# Patient Record
Sex: MALE | Race: WHITE | NOT HISPANIC OR LATINO | Employment: OTHER | URBAN - NONMETROPOLITAN AREA
[De-identification: names, ages, dates, MRNs, and addresses within clinical notes are randomized per-mention and may not be internally consistent; named-entity substitution may affect disease eponyms.]

---

## 2017-12-12 ENCOUNTER — OFFICE VISIT (OUTPATIENT)
Dept: CARDIOLOGY | Facility: CLINIC | Age: 67
End: 2017-12-12

## 2017-12-12 VITALS
HEIGHT: 70 IN | DIASTOLIC BLOOD PRESSURE: 69 MMHG | WEIGHT: 186.6 LBS | HEART RATE: 67 BPM | BODY MASS INDEX: 26.71 KG/M2 | SYSTOLIC BLOOD PRESSURE: 131 MMHG | OXYGEN SATURATION: 96 %

## 2017-12-12 DIAGNOSIS — R00.2 PALPITATIONS: ICD-10-CM

## 2017-12-12 DIAGNOSIS — I25.10 CORONARY ARTERY DISEASE INVOLVING NATIVE CORONARY ARTERY OF NATIVE HEART WITHOUT ANGINA PECTORIS: Primary | ICD-10-CM

## 2017-12-12 DIAGNOSIS — I10 ESSENTIAL HYPERTENSION: ICD-10-CM

## 2017-12-12 DIAGNOSIS — E78.5 DYSLIPIDEMIA: ICD-10-CM

## 2017-12-12 PROCEDURE — 93000 ELECTROCARDIOGRAM COMPLETE: CPT | Performed by: NURSE PRACTITIONER

## 2017-12-12 PROCEDURE — 99214 OFFICE O/P EST MOD 30 MIN: CPT | Performed by: NURSE PRACTITIONER

## 2017-12-12 RX ORDER — LANOLIN ALCOHOL/MO/W.PET/CERES
400 CREAM (GRAM) TOPICAL DAILY
COMMUNITY

## 2017-12-12 NOTE — PATIENT INSTRUCTIONS
Coronary Artery Disease, Male  Coronary artery disease (CAD) is a process in which the blood vessels of the heart (coronary arteries) become narrow or blocked. The narrowing or blockage can lead to decreased blood flow to the heart muscle (angina). Prolonged reduced blood flow can cause a heart attack (myocardial infarction, MI). Because CAD is the leading cause of death in men, it is important to understand what causes this condition and how it is treated.  CAUSES  Atherosclerosis is the cause of CAD. This is the buildup of fat and cholesterol (plaque) on the inside of the arteries. Over time, the plaque may narrow or block the artery, and this will lessen blood flow to the heart. Plaque can also become weak and break off within a coronary artery to form a clot and cause a sudden blockage.  RISK FACTORS  Many risk factors increase your chances of getting CAD, including:  · High cholesterol levels.  · High blood pressure (hypertension).  · Tobacco use.  · Diabetes.  · Age. Men over age 45 are at a greater risk of CAD.  · Gender. Men often develop CAD earlier in life than women.  · Family history of CAD.  · Obesity.  · Lack of exercise.  · A diet high in saturated fats.  SYMPTOMS   Many people do not experience any symptoms during the early stages of CAD. As the condition progresses, symptoms may include:   · Chest pain.  ¨ The pain can be described as a crushing or squeezing in the chest, or a tightness, pressure, fullness, or heaviness in the chest.  ¨ The pain can last more than a few minutes or can stop and recur.   · Pain in the arms, neck, jaw, or back.  · Unexplained heartburn or indigestion.  · Shortness of breath.  · Nausea.  · Sudden cold sweats.   Less common symptoms of CAD in men can include:  · Fatigue.  · Unexplained feelings of nervousness or anxiety.  · Weakness.  · Diarrhea.  · Sudden light-headedness.  DIAGNOSIS   Tests to diagnose CAD may include:  · ECG (electrocardiogram).  · Exercise stress  test. This looks for signs of blockage when the heart is being exercised.  · Pharmacologic stress test. This test looks for signs of blockage when the heart is being stressed with a medicine.  · Blood tests.  · Coronary angiogram. This is a procedure to look at the coronary arteries to see if there is any blockage.  TREATMENT  The treatment of CAD may include the following:  · Healthy behavioral changes to reduce or control risk factors.  · Medicine.  · Coronary stenting. A stent helps to keep an artery open.  · Coronary angioplasty. This procedure widens a narrowed or blocked artery.  · Coronary artery bypass surgery. This will allow your blood to pass the blockage (bypass) to reach your heart.  HOME CARE INSTRUCTIONS  · Take medicines only as directed by your health care provider.  · Do not take the following medicines unless your health care provider approves:    Nonsteroidal anti-inflammatory drugs (NSAIDs), such as ibuprofen, naproxen, or celecoxib.    Vitamin supplements that contain vitamin A, vitamin E, or both.  · Manage other health conditions such as hypertension and diabetes as directed by your health care provider.  · Follow a heart-healthy diet. A dietitian can help to educate you about healthy food options and changes.  · Use healthy cooking methods such as roasting, grilling, broiling, baking, poaching, steaming, or stir-frying. Talk to a dietitian to learn more about healthy cooking methods.  · Follow an exercise program approved by your health care provider.  · Maintain a healthy weight. Lose weight as approved by your health care provider.  · Plan rest periods when fatigued.  · Learn to manage stress.  · Do not use any tobacco products, including cigarettes, chewing tobacco, or electronic cigarettes. If you need help quitting, ask your health care provider.  · If you drink alcohol, and your health care provider approves, limit your alcohol intake to no more than 1 drink per day. One drink equals  12 ounces of beer, 5 ounces of wine, or 1½ ounces of hard liquor.  · Stop illegal drug use.  · Your health care provider may ask you to monitor your blood pressure. A blood pressure reading consists of a higher number over a lower number, such as 110 over 72, which is written as 110/72. Ideally, your blood pressure should be:    Below 140/90 if you have no other medical conditions.    Below 130/80 if you have diabetes or kidney disease.  · Keep all follow-up visits as directed by your health care provider. This is important.  SEEK IMMEDIATE MEDICAL CARE IF:  · You have pain in your chest, neck, arm, jaw, stomach, or back that lasts more than a few minutes, is recurring, or is unrelieved by taking medicine under your tongue (sublingual nitroglycerin).  · You have profuse sweating without cause.  · You have unexplained:    Heartburn or indigestion.    Shortness of breath or difficulty breathing.    Nausea or vomiting.    Fatigue.    Feelings of nervousness or anxiety.    Weakness.    Diarrhea.  · You have sudden light-headedness or dizziness.  · You faint.  These symptoms may represent a serious problem that is an emergency. Do not wait to see if the symptoms will go away. Get medical help right away. Call your local emergency services (911 in the [place] Do not drive yourself to the hospital.     This information is not intended to replace advice given to you by your health care provider. Make sure you discuss any questions you have with your health care provider.     Document Released: 07/15/2015 Document Reviewed: 07/15/2015  [employer] Interactive Patient Education ©2017 [employer]

## 2017-12-12 NOTE — PROGRESS NOTES
Subjective   Raghav Warner is a 67 y.o. male     Chief Complaint   Patient presents with   • Hypertension     presents as a 1 year follow up       HPI    Problem list:    1. CAD  1.1 [facility] 11/15/2000 - Stent x 2 LAD  2. Hypertension  2.1 Echo 11/3/15 - EF > 65%; DD I; trace MR and TR; mild KS; PA 40-45  3. Dyslipidemia     Patient is a 67-year-old male who presents today for a follow-up.  He denies any chest pain or pressure.  He says he has very minimal palpitations at rest any barely even feels a little moving sensation.  He denies any dizziness, presyncope, syncope, orthopnea, PND or edema.  He denies any shortness of breath with activity.  He says he has had a lot of stress this year as he lost his mother-in-law, father and multiple friends.  Also just over a week ago his wife has surgery on her femur from a fall where someone fell on her and fractured it.  Says she is still currently in the hospital.  PCP monitors hia cholesterol.  He walks 4 1/2-6 miles/day.    Current Outpatient Prescriptions   Medication Sig Dispense Refill   • aspirin 325 MG tablet Take 325 mg by mouth Daily.     • folic acid (FOLVITE) 400 MCG tablet Take 400 mcg by mouth Daily.     • metoprolol tartrate (LOPRESSOR) 50 MG tablet Take 50 mg by mouth. .5 tablet bid  3   • nitroglycerin (NITROSTAT) 0.4 MG SL tablet Place 0.4 mg under the tongue Every 5 (Five) Minutes As Needed.     • ramipril (ALTACE) 10 MG capsule Take 10 mg by mouth Daily.  5   • simvastatin (ZOCOR) 20 MG tablet Take 20 mg by mouth Every Night.  5     No current facility-administered medications for this visit.        ALLERGIES    Plavix [clopidogrel bisulfate]    Past Medical History:   Diagnosis Date   • CAD (coronary artery disease)    • Dyslipidemia    • Hypertension    • Palpitations        Social History     Social History   • Marital status:      Spouse name: N/A   • Number of children: N/A   • Years of education: N/A     Occupational History   • Not on file.  "    Social History Main Topics   • Smoking status: Former Smoker   • Smokeless tobacco: Never Used   • Alcohol use No   • Drug use: No   • Sexual activity: Not on file     Other Topics Concern   • Not on file     Social History Narrative       Family History   Problem Relation Age of Onset   • Heart attack Other    • Diabetes Other    • Heart disease Other      CABG   • Hypertension Other        Review of Systems   Constitutional: Negative for diaphoresis and fatigue.   HENT: Positive for rhinorrhea and sneezing. Negative for sinus pressure.    Eyes: Positive for visual disturbance.   Respiratory: Negative for chest tightness and shortness of breath.    Cardiovascular: Positive for palpitations (minimal at rest; barely feel a little sensation ). Negative for chest pain and leg swelling.   Gastrointestinal: Negative for constipation, diarrhea, nausea and vomiting.   Endocrine: Negative.    Genitourinary: Negative for difficulty urinating.   Musculoskeletal: Positive for arthralgias, back pain and neck pain. Negative for myalgias.   Skin: Negative.    Allergic/Immunologic: Positive for environmental allergies.   Neurological: Negative for dizziness, syncope, light-headedness and headaches.   Hematological: Does not bruise/bleed easily.   Psychiatric/Behavioral: The patient is nervous/anxious ( patient has been under some stress lately with wife).        Objective   /69 (BP Location: Left arm, Patient Position: Sitting)  Pulse 67  Ht 177.8 cm (70\")  Wt 84.6 kg (186 lb 9.6 oz)  SpO2 96%  BMI 26.77 kg/m2  Vitals:    12/12/17 0816   BP: 131/69   BP Location: Left arm   Patient Position: Sitting   Pulse: 67   SpO2: 96%   Weight: 84.6 kg (186 lb 9.6 oz)   Height: 177.8 cm (70\")      Lab Results (most recent)     None        Physical Exam   Constitutional: He is oriented to person, place, and time. Vital signs are normal. He appears well-developed and well-nourished. He is active and cooperative.   HENT:   Head: " Normocephalic.   Eyes: Lids are normal.   Neck: Normal carotid pulses, no hepatojugular reflux and no JVD present. Carotid bruit is not present.   Cardiovascular: Normal rate, regular rhythm and normal heart sounds.    Pulses:       Radial pulses are 2+ on the right side, and 2+ on the left side.        Dorsalis pedis pulses are 2+ on the right side, and 2+ on the left side.        Posterior tibial pulses are 2+ on the right side, and 2+ on the left side.   No edema BLE.   Pulmonary/Chest: Effort normal and breath sounds normal.   Abdominal: Normal appearance and bowel sounds are normal.   Neurological: He is alert and oriented to person, place, and time.   Skin: Skin is warm, dry and intact.   Psychiatric: He has a normal mood and affect. His speech is normal and behavior is normal. Judgment and thought content normal. Cognition and memory are normal.       Procedure     ECG 12 Lead  Date/Time: 12/12/2017 8:22 AM  Performed by: ANDERSON CALERO  Authorized by: ANDERSON CALERO   Comparison: compared with previous ECG from 12/23/2016  Similar to previous ECG  Rhythm: sinus rhythm  Rate: normal  BPM: 62  QRS axis: normal  Clinical impression: normal ECG  Comments: Palpitations   Hypertension                    Assessment/Plan      Diagnosis Plan   1. Coronary artery disease involving native coronary artery of native heart without angina pectoris     2. Palpitations  ECG 12 Lead   3. Essential hypertension  ECG 12 Lead   4. Dyslipidemia         Return in about 1 year (around 12/12/2018).  CAD-patient is on aspirin, beta and statin.  Palpitations-patient filled disease are very minimal and nonproblematic.  Hypertension-patient doing well.  Dyslipidemia-patient is on Zocor monitor by PCP.  He will continue his medication regimen.  He'll follow-up in one year or sooner if any changes.    Called PCP to [facility]. [phone].         Electronically signed by:

## 2018-12-12 ENCOUNTER — OFFICE VISIT (OUTPATIENT)
Dept: CARDIOLOGY | Facility: CLINIC | Age: 68
End: 2018-12-12

## 2018-12-12 VITALS
DIASTOLIC BLOOD PRESSURE: 73 MMHG | OXYGEN SATURATION: 99 % | SYSTOLIC BLOOD PRESSURE: 130 MMHG | WEIGHT: 188.6 LBS | BODY MASS INDEX: 27 KG/M2 | HEART RATE: 76 BPM | HEIGHT: 70 IN

## 2018-12-12 DIAGNOSIS — E78.5 DYSLIPIDEMIA: ICD-10-CM

## 2018-12-12 DIAGNOSIS — I10 ESSENTIAL HYPERTENSION: ICD-10-CM

## 2018-12-12 DIAGNOSIS — R00.2 PALPITATIONS: ICD-10-CM

## 2018-12-12 DIAGNOSIS — I25.10 CORONARY ARTERY DISEASE INVOLVING NATIVE CORONARY ARTERY OF NATIVE HEART WITHOUT ANGINA PECTORIS: Primary | ICD-10-CM

## 2018-12-12 PROCEDURE — 93000 ELECTROCARDIOGRAM COMPLETE: CPT | Performed by: NURSE PRACTITIONER

## 2018-12-12 PROCEDURE — 99213 OFFICE O/P EST LOW 20 MIN: CPT | Performed by: NURSE PRACTITIONER

## 2018-12-12 NOTE — PROGRESS NOTES
Subjective   Raghav Warner is a 68 y.o. male     Chief Complaint   Patient presents with   • Follow-up     Pt in office for annual follow up    • Chest Pain   • Coronary Artery Disease       HPI    Problem list:    1. CAD  1.1 [facility] 11/15/2000 - Stent x 2 LAD  2. Hypertension  2.1 Echo 11/3/15 - EF > 65%; DD I; trace MR and TR; mild CT; PA 40-45  3. Dyslipidemia     Patient is a 68-year-old male who presents today for follow-up.  He denies any chest pain, pressure, dizziness, presyncope, syncope, orthopnea, PND or edema.  He says that he does have palpitations at times but they're very rare.  He states that he does not have any shortness of breath with activity.  He walks anywhere from 4-6 miles a day.  He's going fishing tonight.  PCP monitors his cholesterol.  He says that he had epigastric pain and real bad weakness when he had his previous stents placed.  He says he's had none of this.    Current Outpatient Medications   Medication Sig Dispense Refill   • aspirin 325 MG tablet Take 325 mg by mouth Daily.     • folic acid (FOLVITE) 400 MCG tablet Take 400 mcg by mouth Daily.     • metoprolol tartrate (LOPRESSOR) 50 MG tablet Take 50 mg by mouth. .5 tablet bid  3   • nitroglycerin (NITROSTAT) 0.4 MG SL tablet Place 0.4 mg under the tongue Every 5 (Five) Minutes As Needed.     • ramipril (ALTACE) 10 MG capsule Take 10 mg by mouth Daily.  5   • simvastatin (ZOCOR) 20 MG tablet Take 20 mg by mouth Every Night.  5     No current facility-administered medications for this visit.        ALLERGIES    Plavix [clopidogrel bisulfate]    Past Medical History:   Diagnosis Date   • Bone spur     neck    • Bulging lumbar disc    • CAD (coronary artery disease)    • Dyslipidemia    • Hypertension    • Palpitations        Social History     Socioeconomic History   • Marital status:      Spouse name: Not on file   • Number of children: Not on file   • Years of education: Not on file   • Highest education level: Not on file  "  Social Needs   • Financial resource strain: Not on file   • Food insecurity - worry: Not on file   • Food insecurity - inability: Not on file   • Transportation needs - medical: Not on file   • Transportation needs - non-medical: Not on file   Occupational History   • Not on file   Tobacco Use   • Smoking status: Former Smoker   • Smokeless tobacco: Never Used   Substance and Sexual Activity   • Alcohol use: No   • Drug use: No   • Sexual activity: Not on file   Other Topics Concern   • Not on file   Social History Narrative   • Not on file       Family History   Problem Relation Age of Onset   • Heart attack Other    • Diabetes Other    • Heart disease Other         CABG   • Hypertension Other        Review of Systems   Constitutional: Negative for diaphoresis and fever.   HENT: Positive for postnasal drip and rhinorrhea (\"sinus problems\" ). Negative for congestion and sore throat.    Eyes: Positive for visual disturbance (reading glasses).   Respiratory: Positive for cough (dry cough, but patient says it is not bothersome and doesn't want to change anything ). Negative for chest tightness and shortness of breath.    Cardiovascular: Positive for palpitations (occasionally). Negative for chest pain and leg swelling.   Gastrointestinal: Negative for abdominal pain, blood in stool, constipation, diarrhea, nausea and vomiting.   Endocrine: Negative for cold intolerance and heat intolerance.        Pt states that he sweats really easily. States in the summer he will walk around and the sweat drenches his shirt   Genitourinary: Positive for frequency (enlarged prostate) and urgency (enlarged prostate). Negative for difficulty urinating, dysuria and hematuria.   Musculoskeletal: Positive for arthralgias, back pain (disc) and neck pain (bone sppur).   Skin: Negative for rash and wound.   Allergic/Immunologic: Negative for environmental allergies and food allergies.   Neurological: Positive for numbness (some in right " "arm- states he was told it's due to his neck ). Negative for dizziness, syncope, weakness, light-headedness and headaches.   Hematological: Does not bruise/bleed easily.   Psychiatric/Behavioral: Positive for sleep disturbance (States that he has some pain in his back and neck that keeps him awake ).       Objective   /73   Pulse 76   Ht 177.8 cm (70\")   Wt 85.5 kg (188 lb 9.6 oz)   SpO2 99%   BMI 27.06 kg/m²   Vitals:    12/12/18 0811   BP: 130/73   Pulse: 76   SpO2: 99%   Weight: 85.5 kg (188 lb 9.6 oz)   Height: 177.8 cm (70\")      Lab Results (most recent)     None        Physical Exam   Constitutional: He is oriented to person, place, and time. Vital signs are normal. He appears well-developed and well-nourished. He is active and cooperative.   HENT:   Head: Normocephalic.   Eyes: Lids are normal.   Neck: Normal carotid pulses, no hepatojugular reflux and no JVD present. Carotid bruit is not present.   Cardiovascular: Normal rate, regular rhythm and normal heart sounds.  Occasional extrasystoles are present.   Pulses:       Radial pulses are 2+ on the right side, and 2+ on the left side.        Dorsalis pedis pulses are 2+ on the right side, and 2+ on the left side.        Posterior tibial pulses are 2+ on the right side, and 2+ on the left side.   No edema BLE.    Pulmonary/Chest: Effort normal and breath sounds normal.   Abdominal: Normal appearance and bowel sounds are normal.   Neurological: He is alert and oriented to person, place, and time.   Skin: Skin is warm, dry and intact.   Psychiatric: He has a normal mood and affect. His speech is normal and behavior is normal. Judgment and thought content normal. Cognition and memory are normal.       Procedure     ECG 12 Lead  Date/Time: 12/12/2018 8:37 AM  Performed by: iD Paz APRN  Authorized by: Di Paz APRN   Comparison: compared with previous ECG from 12/12/2017  Rhythm: sinus rhythm  Ectopy: atrial premature " contractions  Rate: normal  BPM: 80  QRS axis: normal  Q waves: II and aVF  Clinical impression: non-specific ECG                 Assessment/Plan      Diagnosis Plan   1. Coronary artery disease involving native coronary artery of native heart without angina pectoris     2. Essential hypertension     3. Dyslipidemia     4. Palpitations         Return in about 1 year (around 12/12/2019).       CAD-patient is on aspirin, statin and beta.  Hypertension-patient doing well.  Hyperlipidemia-patient is on simvastatin by PCP.  Palpitations-stable.  Patient continues medication regimen.  He'll follow-up in one year or sooner if any changes.  I did talk to the patient is a dry cough and advised it could be from his Altase but he does not want to make any changes at this time.      Patient's Body mass index is 27.06 kg/m². BMI is above normal parameters. Recommendations include: educational material.      Electronically signed by:

## 2018-12-12 NOTE — PATIENT INSTRUCTIONS
"Fat and Cholesterol Restricted Diet  Getting too much fat and cholesterol in your diet may cause health problems. Following this diet helps keep your fat and cholesterol at normal levels. This can keep you from getting sick.  What types of fat should I choose?  · Choose monosaturated and polyunsaturated fats. These are found in foods such as olive oil, canola oil, flaxseeds, walnuts, almonds, and seeds.  · Eat more omega-3 fats. Good choices include salmon, mackerel, sardines, tuna, flaxseed oil, and ground flaxseeds.  · Limit saturated fats. These are in animal products such as meats, butter, and cream. They can also be in plant products such as palm oil, palm kernel oil, and coconut oil.  · Avoid foods with partially hydrogenated oils in them. These contain trans fats. Examples of foods that have trans fats are stick margarine, some tub margarines, cookies, crackers, and other baked goods.  What general guidelines do I need to follow?  · Check food labels. Look for the words \"trans fat\" and \"saturated fat.\"  · When preparing a meal:  ? Fill half of your plate with vegetables and green salads.  ? Fill one fourth of your plate with whole grains. Look for the word \"whole\" as the first word in the ingredient list.  ? Fill one fourth of your plate with lean protein foods.  · Eat more foods that have fiber, like apples, carrots, beans, peas, and barley.  · Eat more home-cooked foods. Eat less at restaurants and buffets.  · Limit or avoid alcohol.  · Limit foods high in starch and sugar.  · Limit fried foods.  · Cook foods without frying them. Baking, boiling, grilling, and broiling are all great options.  · Lose weight if you are overweight. Losing even a small amount of weight can help your overall health. It can also help prevent diseases such as diabetes and heart disease.  What foods can I eat?  Grains  Whole grains, such as whole wheat or whole grain breads, crackers, cereals, and pasta. Unsweetened oatmeal, " bulgur, barley, quinoa, or brown rice. Corn or whole wheat flour tortillas.  Vegetables  Fresh or frozen vegetables (raw, steamed, roasted, or grilled). Green salads.  Fruits  All fresh, canned (in natural juice), or frozen fruits.  Meat and Other Protein Products  Ground beef (85% or leaner), grass-fed beef, or beef trimmed of fat. Skinless chicken or turkey. Ground chicken or turkey. Pork trimmed of fat. All fish and seafood. Eggs. Dried beans, peas, or lentils. Unsalted nuts or seeds. Unsalted canned or dry beans.  Dairy  Low-fat dairy products, such as skim or 1% milk, 2% or reduced-fat cheeses, low-fat ricotta or cottage cheese, or plain low-fat yogurt.  Fats and Oils  Tub margarines without trans fats. Light or reduced-fat mayonnaise and salad dressings. Avocado. Olive, canola, sesame, or safflower oils. Natural peanut or almond butter (choose ones without added sugar and oil).  The items listed above may not be a complete list of recommended foods or beverages. Contact your dietitian for more options.  What foods are not recommended?  Grains  White bread. White pasta. White rice. Cornbread. Bagels, pastries, and croissants. Crackers that contain trans fat.  Vegetables  White potatoes. Corn. Creamed or fried vegetables. Vegetables in a cheese sauce.  Fruits  Dried fruits. Canned fruit in light or heavy syrup. Fruit juice.  Meat and Other Protein Products  Fatty cuts of meat. Ribs, chicken wings, jessica, sausage, bologna, salami, chitterlings, fatback, hot dogs, bratwurst, and packaged luncheon meats. Liver and organ meats.  Dairy  Whole or 2% milk, cream, half-and-half, and cream cheese. Whole milk cheeses. Whole-fat or sweetened yogurt. Full-fat cheeses. Nondairy creamers and whipped toppings. Processed cheese, cheese spreads, or cheese curds.  Sweets and Desserts  Corn syrup, sugars, honey, and molasses. Candy. Jam and jelly. Syrup. Sweetened cereals. Cookies, pies, cakes, donuts, muffins, and ice  cream.  Fats and Oils  Butter, stick margarine, lard, shortening, ghee, or jessica fat. Coconut, palm kernel, or palm oils.  Beverages  Alcohol. Sweetened drinks (such as sodas, lemonade, and fruit drinks or punches).  The items listed above may not be a complete list of foods and beverages to avoid. Contact your dietitian for more information.  This information is not intended to replace advice given to you by your health care provider. Make sure you discuss any questions you have with your health care provider.  Document Released: 06/18/2013 Document Revised: 08/24/2017 Document Reviewed: 03/19/2015  [employer] Interactive Patient Education © 2018 [employer]  BMI for Adults  Body mass index (BMI) is a number that is calculated from a person's weight and height. In most adults, the number is used to find how much of an adult's weight is made up of fat. BMI is not as accurate as a direct measure of body fat.  How is BMI calculated?  BMI is calculated by dividing weight in kilograms by height in meters squared. It can also be calculated by dividing weight in pounds by height in inches squared, then multiplying the resulting number by 703. Charts are available to help you find your BMI quickly and easily without doing this calculation.  How is BMI interpreted?  Health care professionals use BMI charts to identify whether an adult is underweight, at a normal weight, or overweight based on the following guidelines:  · Underweight: BMI less than 18.5.  · Normal weight: BMI between 18.5 and 24.9.  · Overweight: BMI between 25 and 29.9.  · Obese: BMI of 30 and above.    BMI is usually interpreted the same for males and females.  Weight includes both fat and muscle, so someone with a muscular build, such as an athlete, may have a BMI that is higher than 24.9. In cases like these, BMI may not accurately depict body fat. To determine if excess body fat is the cause of a BMI of 25 or higher, further assessments may need to be  done by a health care provider.  Why is BMI a useful tool?  BMI is used to identify a possible weight problem that may be related to a medical problem or may increase the risk for medical problems. BMI can also be used to promote changes to reach a healthy weight.  This information is not intended to replace advice given to you by your health care provider. Make sure you discuss any questions you have with your health care provider.  Document Released: 08/29/2005 Document Revised: 04/27/2017 Document Reviewed: 05/15/2015  [email or web address] Interactive Patient Education © 2018 [email or web address]    Coronary Artery Disease, Male  Coronary artery disease (CAD) is a condition in which the arteries that lead to the heart (coronary arteries) become narrow or blocked. The narrowing or blockage can lead to decreased blood flow to the heart. Prolonged reduced blood flow can cause a heart attack (myocardial infarction or MI). This condition may also be called coronary heart disease.  Because CAD is the leading cause of death in men, it is important to understand what causes this condition and how it is treated.  What are the causes?  CAD is most often caused by atherosclerosis. This is the buildup of fat and cholesterol (plaque) on the inside of the arteries. Over time, the plaque may narrow or block the artery, reducing blood flow to the heart. Plaque can also become weak and break off within a coronary artery and cause a sudden blockage. Other less common causes of CAD include:  · An embolism or blood clot in a coronary artery.  · A tearing of the artery (spontaneous coronary artery dissection).  · An aneurysm.  · Inflammation (vasculitis) in the artery wall.    What increases the risk?  The following factors may make you more likely to develop this condition:  · Age. Men over age 45 are at a greater risk of CAD.  · Family history of CAD.  · Gender. Men often develop CAD earlier in life than women.  · High blood pressure  (hypertension).  · Diabetes.  · High cholesterol levels.  · Tobacco use.  · Excessive alcohol use.  · Lack of exercise.  · A diet high in saturated and trans fats, such as fried food and processed meat.    Other possible risk factors include:  · High stress levels.  · Depression.  · Obesity.  · Sleep apnea.    What are the signs or symptoms?  Many people do not have any symptoms during the early stages of CAD. As the condition progresses, symptoms may include:  · Chest pain (angina). The pain can:  ? Feel like a crushing or squeezing, or a tightness, pressure, fullness, or heaviness in the chest.  ? Last more than a few minutes or can stop and recur. The pain tends to get worse with exercise or stress and to fade with rest.  · Pain in the arms, neck, jaw, or back.  · Unexplained heartburn or indigestion.  · Shortness of breath.  · Nausea or vomiting.  · Sudden light-headedness.  · Sudden cold sweats.  · Fluttering or fast heartbeat (palpitations).    How is this diagnosed?  This condition is diagnosed based on:  · Your family and medical history.  · A physical exam.  · Tests, including:  ? A test to check the electrical signals in your heart (electrocardiogram).  ? Exercise stress test. This looks for signs of blockage when the heart is stressed with exercise, such as running on a treadmill.  ? Pharmacologic stress test. This test looks for signs of blockage when the heart is being stressed with a medicine.  ? Blood tests.  ? Coronary angiogram. This is a procedure to look at the coronary arteries to see if there is any blockage. During this test, a dye is injected into your arteries so they appear on an X-ray.  ? A test that uses sound waves to take a picture of your heart (echocardiogram).  ? Chest X-ray.    How is this treated?  This condition may be treated by:  · Healthy lifestyle changes to reduce risk factors.  · Medicines such as:  ? Antiplatelet medicines and blood-thinning medicines, such as aspirin. These  help to prevent blood clots.  ? Nitroglycerin.  ? Blood pressure medicines.  ? Cholesterol-lowering medicine.  · Coronary angioplasty and stenting. During this procedure, a thin, flexible tube is inserted through a blood vessel and into a blocked artery. A balloon or similar device on the end of the tube is inflated to open up the artery. In some cases, a small, mesh tube (stent) is inserted into the artery to keep it open.  · Coronary artery bypass surgery. During this surgery, veins or arteries from other parts of the body are used to create a bypass around the blockage and allow blood to reach your heart.    Follow these instructions at home:  Medicines  · Take over-the-counter and prescription medicines only as told by your health care provider.  · Do not take the following medicines unless your health care provider approves:  ? NSAIDs, such as ibuprofen, naproxen, or celecoxib.  ? Vitamin supplements that contain vitamin A, vitamin E, or both.  Lifestyle  · Follow an exercise program approved by your health care provider. Aim for 150 minutes of moderate exercise or 75 minutes of vigorous exercise each week.  · Maintain a healthy weight or lose weight as approved by your health care provider.  · Rest when you are tired.  · Learn to manage stress or try to limit your stress. Ask your health care provider for suggestions if you need help.  · Get screened for depression and seek treatment, if needed.  · Do not use any products that contain nicotine or tobacco, such as cigarettes and e-cigarettes. If you need help quitting, ask your health care provider.  · Do not use illegal drugs.  Eating and drinking  · Follow a heart-healthy diet. A dietitian can help educate you about healthy food options and changes. In general, eat plenty of fruits and vegetables, lean meats, and whole grains.  · Avoid foods high in:  ? Sugar.  ? Salt (sodium).  ? Saturated fat, such as processed or fatty meat.  ? Trans fat, such as fried  "foods.  · Use healthy cooking methods such as roasting, grilling, broiling, baking, poaching, steaming, or stir-frying.  · If you drink alcohol, and your health care provider approves, limit your alcohol intake to no more than 2 drinks per day. One drink equals 12 ounces of beer, 5 ounces of wine, or 1½ ounces of hard liquor.  General instructions  · Manage any other health conditions, such as hypertension and diabetes. These conditions affect your heart.  · Your health care provider may ask you to monitor your blood pressure. Ideally, your blood pressure should be below 130/80.  · Keep all follow-up visits as told by your health care provider. This is important.  Get help right away if:  · You have pain in your chest, neck, arm, jaw, stomach, or back that:  ? Lasts more than a few minutes.  ? Is recurring.  ? Is not relieved by taking medicine under your tongue (sublingualnitroglycerin).  · You have too much (profuse) sweating without cause.  · You have unexplained:  ? Heartburn or indigestion.  ? Shortness of breath or difficulty breathing.  ? Fluttering or fast heartbeat (palpitations).  ? Nausea or vomiting.  ? Fatigue.  ? Feelings of nervousness or anxiety.  ? Weakness.  ? Diarrhea.  · You have sudden light-headedness or dizziness.  · You faint.  · You feel like hurting yourself or think about taking your own life.  These symptoms may represent a serious problem that is an emergency. Do not wait to see if the symptoms will go away. Get medical help right away. Call your local emergency services (911 in the [place] Do not drive yourself to the hospital.  Summary  · Coronary artery disease (CAD) is a process in which the arteries that lead to the heart (coronary arteries) become narrow or blocked. The narrowing or blockage can lead to a heart attack.  · Many people do not have any symptoms during the early stages of CAD. This is called \"silent CAD.\"  · CAD can be treated with lifestyle changes, medicines, surgery, " or a combination of these treatments.  This information is not intended to replace advice given to you by your health care provider. Make sure you discuss any questions you have with your health care provider.  Document Released: 07/15/2015 Document Revised: 12/08/2017 Document Reviewed: 12/08/2017  [employer] Interactive Patient Education © 2018 [employer]

## 2019-12-12 ENCOUNTER — OFFICE VISIT (OUTPATIENT)
Dept: CARDIOLOGY | Facility: CLINIC | Age: 69
End: 2019-12-12

## 2019-12-12 VITALS
OXYGEN SATURATION: 97 % | SYSTOLIC BLOOD PRESSURE: 147 MMHG | DIASTOLIC BLOOD PRESSURE: 79 MMHG | HEIGHT: 70 IN | HEART RATE: 92 BPM | WEIGHT: 189.8 LBS | BODY MASS INDEX: 27.17 KG/M2

## 2019-12-12 DIAGNOSIS — R00.2 PALPITATIONS: ICD-10-CM

## 2019-12-12 DIAGNOSIS — E78.5 DYSLIPIDEMIA: ICD-10-CM

## 2019-12-12 DIAGNOSIS — I10 ESSENTIAL HYPERTENSION: ICD-10-CM

## 2019-12-12 DIAGNOSIS — I25.10 CORONARY ARTERY DISEASE INVOLVING NATIVE CORONARY ARTERY OF NATIVE HEART WITHOUT ANGINA PECTORIS: Primary | ICD-10-CM

## 2019-12-12 PROCEDURE — 99214 OFFICE O/P EST MOD 30 MIN: CPT | Performed by: NURSE PRACTITIONER

## 2019-12-12 PROCEDURE — 93000 ELECTROCARDIOGRAM COMPLETE: CPT | Performed by: NURSE PRACTITIONER

## 2019-12-12 NOTE — PATIENT INSTRUCTIONS
"Fat and Cholesterol Restricted Eating Plan  Getting too much fat and cholesterol in your diet may cause health problems. Choosing the right foods helps keep your fat and cholesterol at normal levels. This can keep you from getting certain diseases.  Your doctor may recommend an eating plan that includes:  · Total fat: ______% or less of total calories a day.  · Saturated fat: ______% or less of total calories a day.  · Cholesterol: less than _________mg a day.  · Fiber: ______g a day.  What are tips for following this plan?  Meal planning  · At meals, divide your plate into four equal parts:  ? Fill one-half of your plate with vegetables and green salads.  ? Fill one-fourth of your plate with whole grains.  ? Fill one-fourth of your plate with low-fat (lean) protein foods.  · Eat fish that is high in omega-3 fats at least two times a week. This includes mackerel, tuna, sardines, and salmon.  · Eat foods that are high in fiber, such as whole grains, beans, apples, broccoli, carrots, peas, and barley.  General tips    · Work with your doctor to lose weight if you need to.  · Avoid:  ? Foods with added sugar.  ? Fried foods.  ? Foods with partially hydrogenated oils.  · Limit alcohol intake to no more than 1 drink a day for nonpregnant women and 2 drinks a day for men. One drink equals 12 oz of beer, 5 oz of wine, or 1½ oz of hard liquor.  Reading food labels  · Check food labels for:  ? Trans fats.  ? Partially hydrogenated oils.  ? Saturated fat (g) in each serving.  ? Cholesterol (mg) in each serving.  ? Fiber (g) in each serving.  · Choose foods with healthy fats, such as:  ? Monounsaturated fats.  ? Polyunsaturated fats.  ? Omega-3 fats.  · Choose grain products that have whole grains. Look for the word \"whole\" as the first word in the ingredient list.  Cooking  · Cook foods using low-fat methods. These include baking, boiling, grilling, and broiling.  · Eat more home-cooked foods. Eat at restaurants and buffets " less often.  · Avoid cooking using saturated fats, such as butter, cream, palm oil, palm kernel oil, and coconut oil.  Recommended foods    Fruits  · All fresh, canned (in natural juice), or frozen fruits.  Vegetables  · Fresh or frozen vegetables (raw, steamed, roasted, or grilled). Green salads.  Grains  · Whole grains, such as whole wheat or whole grain breads, crackers, cereals, and pasta. Unsweetened oatmeal, bulgur, barley, quinoa, or brown rice. Corn or whole wheat flour tortillas.  Meats and other protein foods  · Ground beef (85% or leaner), grass-fed beef, or beef trimmed of fat. Skinless chicken or turkey. Ground chicken or turkey. Pork trimmed of fat. All fish and seafood. Egg whites. Dried beans, peas, or lentils. Unsalted nuts or seeds. Unsalted canned beans. Nut butters without added sugar or oil.  Dairy  · Low-fat or nonfat dairy products, such as skim or 1% milk, 2% or reduced-fat cheeses, low-fat and fat-free ricotta or cottage cheese, or plain low-fat and nonfat yogurt.  Fats and oils  · Tub margarine without trans fats. Light or reduced-fat mayonnaise and salad dressings. Avocado. Olive, canola, sesame, or safflower oils.  The items listed above may not be a complete list of foods and beverages you can eat. Contact a dietitian for more information.  Foods to avoid  Fruits  · Canned fruit in heavy syrup. Fruit in cream or butter sauce. Fried fruit.  Vegetables  · Vegetables cooked in cheese, cream, or butter sauce. Fried vegetables.  Grains  · White bread. White pasta. White rice. Cornbread. Bagels, pastries, and croissants. Crackers and snack foods that contain trans fat and hydrogenated oils.  Meats and other protein foods  · Fatty cuts of meat. Ribs, chicken wings, jessica, sausage, bologna, salami, chitterlings, fatback, hot dogs, bratwurst, and packaged lunch meats. Liver and organ meats. Whole eggs and egg yolks. Chicken and turkey with skin. Fried meat.  Dairy  · Whole or 2% milk, cream,  half-and-half, and cream cheese. Whole milk cheeses. Whole-fat or sweetened yogurt. Full-fat cheeses. Nondairy creamers and whipped toppings. Processed cheese, cheese spreads, and cheese curds.  Beverages  · Alcohol. Sugar-sweetened drinks such as sodas, lemonade, and fruit drinks.  Fats and oils  · Butter, stick margarine, lard, shortening, ghee, or jessica fat. Coconut, palm kernel, and palm oils.  Sweets and desserts  · Corn syrup, sugars, honey, and molasses. Candy. Jam and jelly. Syrup. Sweetened cereals. Cookies, pies, cakes, donuts, muffins, and ice cream.  The items listed above may not be a complete list of foods and beverages you should avoid. Contact a dietitian for more information.  Summary  · Choosing the right foods helps keep your fat and cholesterol at normal levels. This can keep you from getting certain diseases.  · At meals, fill one-half of your plate with vegetables and green salads.  · Eat high-fiber foods, like whole grains, beans, apples, carrots, peas, and barley.  · Limit added sugar, saturated fats, alcohol, and fried foods.  This information is not intended to replace advice given to you by your health care provider. Make sure you discuss any questions you have with your health care provider.  Document Released: 06/18/2013 Document Revised: 08/21/2019 Document Reviewed: 09/04/2018  [employer] Interactive Patient Education © 2019 [employer]  BMI for Adults    Body mass index (BMI) is a number that is calculated from a person's weight and height. BMI may help to estimate how much of a person's weight is composed of fat. BMI can help identify those who may be at higher risk for certain medical problems.  How is BMI used with adults?  BMI is used as a screening tool to identify possible weight problems. It is used to check whether a person is obese, overweight, healthy weight, or underweight.  How is BMI calculated?  BMI measures your weight and compares it to your height. This can be done  "either in English ([place] or metric measurements. Note that charts are available to help you find your BMI quickly and easily without having to do these calculations yourself.  To calculate your BMI in English ([place] measurements, your health care provider will:  1. Measure your weight in pounds (lb).  2. Multiply the number of pounds by 703.  ? For example, for a person who weighs 180 lb, multiply that number by 703, which equals 126,540.  3. Measure your height in inches (in). Then multiply that number by itself to get a measurement called \"inches squared.\"  ? For example, for a person who is 70 in tall, the \"inches squared\" measurement is 70 in x 70 in, which equals 4900 inches squared.  4. Divide the total from Step 2 (number of lb x 703) by the total from Step 3 (inches squared): 126,540 ÷ 4900 = 25.8. This is your BMI.  To calculate your BMI in metric measurements, your health care provider will:  1. Measure your weight in kilograms (kg).  2. Measure your height in meters (m). Then multiply that number by itself to get a measurement called \"meters squared.\"  ? For example, for a person who is 1.75 m tall, the \"meters squared\" measurement is 1.75 m x 1.75 m, which is equal to 3.1 meters squared.  3. Divide the number of kilograms (your weight) by the meters squared number. In this example: 70 ÷ 3.1 = 22.6. This is your BMI.  How is BMI interpreted?  To interpret your results, your health care provider will use BMI charts to identify whether you are underweight, normal weight, overweight, or obese. The following guidelines will be used:  · Underweight: BMI less than 18.5.  · Normal weight: BMI between 18.5 and 24.9.  · Overweight: BMI between 25 and 29.9.  · Obese: BMI of 30 and above.  Please note:  · Weight includes both fat and muscle, so someone with a muscular build, such as an athlete, may have a BMI that is higher than 24.9. In cases like these, BMI is not an accurate measure of body fat.  · To determine " if excess body fat is the cause of a BMI of 25 or higher, further assessments may need to be done by a health care provider.  · BMI is usually interpreted in the same way for men and women.  Why is BMI a useful tool?  BMI is useful in two ways:  · Identifying a weight problem that may be related to a medical condition, or that may increase the risk for medical problems.  · Promoting lifestyle and diet changes in order to reach a healthy weight.  Summary  · Body mass index (BMI) is a number that is calculated from a person's weight and height.  · BMI may help to estimate how much of a person's weight is composed of fat. BMI can help identify those who may be at higher risk for certain medical problems.  · BMI can be measured using English measurements or metric measurements.  · To interpret your results, your health care provider will use BMI charts to identify whether you are underweight, normal weight, overweight, or obese.  This information is not intended to replace advice given to you by your health care provider. Make sure you discuss any questions you have with your health care provider.  Document Released: 08/29/2005 Document Revised: 10/31/2018 Document Reviewed: 10/31/2018  [employer] Interactive Patient Education © 2019 [email or web address]

## 2019-12-12 NOTE — PROGRESS NOTES
Subjective   Raghva Warner is a 69 y.o. male     Chief Complaint   Patient presents with   • Follow-up     Here for a one year follow up        HPI    Problem list:    1. CAD  1.1 LHC 11/15/2000 - Stent x 2 LAD  2. Hypertension  2.1 Echo 11/3/15 - EF > 65%; DD I; trace MR and TR; mild NJ; PA 40-45  3. Dyslipidemia     Patient is a 69-year-old male who presents today for follow-up.  He denies any chest pain or pressure.  He says he has just an occasional palpitation.  He denies any dizziness, presyncope, syncope, orthopnea, PND or edema.  He says he gets lightheaded with very rare when he stands too fast.  He says typically this is when he is exercising taking his blood pressure medicine and when he comes back his blood pressure is sometimes 99 systolic or 101 systolic.  He says otherwise his blood pressure is usually in the 120s.  He says this is not very frequent at all that his blood pressure gets that low.  He denies any shortness of breath with activity.  PCP monitors his cholesterol which he brought in with for me today and his last LDL was 73.    Current Outpatient Medications on File Prior to Visit   Medication Sig Dispense Refill   • aspirin 325 MG tablet Take 325 mg by mouth Daily.     • folic acid (FOLVITE) 400 MCG tablet Take 400 mcg by mouth Daily.     • metoprolol tartrate (LOPRESSOR) 50 MG tablet Take 50 mg by mouth. .5 tablet bid  3   • nitroglycerin (NITROSTAT) 0.4 MG SL tablet Place 0.4 mg under the tongue Every 5 (Five) Minutes As Needed.     • ramipril (ALTACE) 10 MG capsule Take 10 mg by mouth Daily.  5   • simvastatin (ZOCOR) 20 MG tablet Take 20 mg by mouth Every Other Day.  5     No current facility-administered medications on file prior to visit.        ALLERGIES    Plavix [clopidogrel bisulfate] and Prednisone    Past Medical History:   Diagnosis Date   • Bone spur     neck    • Bulging lumbar disc    • CAD (coronary artery disease)    • Dyslipidemia    • Hypertension    • Palpitations         Social History     Socioeconomic History   • Marital status:      Spouse name: Not on file   • Number of children: Not on file   • Years of education: Not on file   • Highest education level: Not on file   Tobacco Use   • Smoking status: Former Smoker   • Smokeless tobacco: Never Used   Substance and Sexual Activity   • Alcohol use: No   • Drug use: No       Family History   Problem Relation Age of Onset   • Heart attack Other    • Diabetes Other    • Heart disease Other         CABG   • Hypertension Other    • Diabetes Mother    • Hypertension Mother    • Diabetes Father    • Hypertension Father    • Heart disease Father    • Hypertension Sister        Review of Systems   Constitutional: Positive for fatigue. Negative for chills and fever.   HENT: Positive for rhinorrhea. Negative for congestion and sore throat.    Eyes: Positive for visual disturbance (reading glasses ).   Respiratory: Negative.  Negative for chest tightness and shortness of breath.    Cardiovascular: Positive for palpitations (occasional palps ). Negative for chest pain and leg swelling.   Gastrointestinal: Negative.  Negative for abdominal pain, blood in stool, nausea and vomiting.   Endocrine: Negative.  Negative for cold intolerance and heat intolerance.   Genitourinary: Positive for difficulty urinating and frequency. Negative for dysuria, hematuria and urgency.   Musculoskeletal: Positive for arthralgias (joints ), back pain (pain in entire back ) and neck pain.   Skin: Negative.  Negative for rash and wound.   Allergic/Immunologic: Negative.  Negative for environmental allergies and food allergies.   Neurological: Positive for light-headedness (rarely when standing too fast ). Negative for dizziness and weakness.   Hematological: Negative.  Does not bruise/bleed easily.   Psychiatric/Behavioral: Negative.  Negative for sleep disturbance (denies waking with smothering ).       Objective   /79 (BP Location: Left arm,  "Patient Position: Sitting)   Pulse 92   Ht 177.8 cm (70\")   Wt 86.1 kg (189 lb 12.8 oz)   SpO2 97%   BMI 27.23 kg/m²   Vitals:    12/12/19 0808   BP: 147/79   BP Location: Left arm   Patient Position: Sitting   Pulse: 92   SpO2: 97%   Weight: 86.1 kg (189 lb 12.8 oz)   Height: 177.8 cm (70\")      Lab Results (most recent)     None        Physical Exam   Constitutional: He is oriented to person, place, and time. Vital signs are normal. He appears well-developed and well-nourished. He is active and cooperative.   HENT:   Head: Normocephalic.   Eyes: Lids are normal.   Neck: Normal carotid pulses, no hepatojugular reflux and no JVD present. Carotid bruit is not present.   Cardiovascular: Normal rate, regular rhythm and normal heart sounds.   Pulses:       Radial pulses are 2+ on the right side, and 2+ on the left side.        Dorsalis pedis pulses are 2+ on the right side, and 2+ on the left side.        Posterior tibial pulses are 2+ on the right side, and 2+ on the left side.   No edema BLE.   Pulmonary/Chest: Effort normal and breath sounds normal.   Abdominal: Normal appearance and bowel sounds are normal.   Neurological: He is alert and oriented to person, place, and time.   Skin: Skin is warm, dry and intact.   Psychiatric: He has a normal mood and affect. His speech is normal and behavior is normal. Judgment and thought content normal. Cognition and memory are normal.       Procedure     ECG 12 Lead  Date/Time: 12/12/2019 8:43 AM  Performed by: Di Paz APRN  Authorized by: Di Paz APRN   Comparison: compared with previous ECG from 12/12/2018  Comparison to previous ECG: NO PVC  Rhythm: sinus rhythm  Rate: normal  BPM: 78  QRS axis: normal    Clinical impression: normal ECG                 Assessment/Plan      Diagnosis Plan   1. Coronary artery disease involving native coronary artery of native heart without angina pectoris     2. Essential hypertension     3. Palpitations     4. " Dyslipidemia         Return in about 1 year (around 12/12/2020).    CAD-patient is on aspirin, beta and statin.  Hypertension-patient's blood pressure is slightly elevated now but he states at home it typically runs in the 120s and even sometimes 99 systolic.  He is taking metoprolol and Altace.  Palpitations-stable on beta-blocker.  Dyslipidemia-patient is on simvastatin tomorrow by PCP and doing very well.  He will continue his medication regimen.  He will follow-up in 1 year or sooner if any changes.         Patient's Body mass index is 27.23 kg/m². BMI is above normal parameters. Recommendations include: educational material and referral to primary care.      Electronically signed by:

## 2020-10-12 ENCOUNTER — TELEPHONE (OUTPATIENT)
Dept: CARDIOLOGY | Facility: CLINIC | Age: 70
End: 2020-10-12

## 2020-10-14 NOTE — TELEPHONE ENCOUNTER
Reviewed by Di Paz and found to be at acceptable risk. OK for patient to change to Aspirin 81mg once daily 5 days prior to procedure and then resume 325mg once daily after procedure. Letter faxed.  LEE MACKEY

## 2020-12-14 ENCOUNTER — OFFICE VISIT (OUTPATIENT)
Dept: CARDIOLOGY | Facility: CLINIC | Age: 70
End: 2020-12-14

## 2020-12-14 VITALS
HEART RATE: 68 BPM | DIASTOLIC BLOOD PRESSURE: 70 MMHG | WEIGHT: 188 LBS | BODY MASS INDEX: 26.32 KG/M2 | HEIGHT: 71 IN | OXYGEN SATURATION: 99 % | SYSTOLIC BLOOD PRESSURE: 153 MMHG

## 2020-12-14 DIAGNOSIS — I25.10 CORONARY ARTERY DISEASE INVOLVING NATIVE CORONARY ARTERY OF NATIVE HEART WITHOUT ANGINA PECTORIS: Primary | ICD-10-CM

## 2020-12-14 DIAGNOSIS — I10 ESSENTIAL HYPERTENSION: ICD-10-CM

## 2020-12-14 DIAGNOSIS — R00.2 PALPITATIONS: ICD-10-CM

## 2020-12-14 DIAGNOSIS — E78.5 DYSLIPIDEMIA: ICD-10-CM

## 2020-12-14 PROCEDURE — 99214 OFFICE O/P EST MOD 30 MIN: CPT | Performed by: NURSE PRACTITIONER

## 2020-12-14 NOTE — PATIENT INSTRUCTIONS
How to Quarantine at Home  Information for Patients and Families    These instructions are for people with confirmed or suspected COVID-19 who do not need to be hospitalized and those with confirmed COVID-19 who were hospitalized and discharged to care for themselves at home.    If you were tested through the Health Department  The Health Department will monitor your wellbeing.  If it is determined that you do not need to be hospitalized and can be isolated at home, you will be monitored by staff from your local or state health department.     If you were tested through a Commercial [facility]  You will need to monitor yourself and report changes in your symptoms to your doctor.  See the section below called Monitor Your Symptoms.    Follow these steps until a healthcare provider or local or state health department says you can return to your normal activities.    Stay home except to get medical care  • Restrict activities outside your home, except for getting medical care.   • Do not go to work, school, or public areas.   • Avoid using public transportation, ride-sharing, or taxis.    Separate yourself from other people and animals in your home  People  As much as possible, you should stay in a specific room and away from other people in your home. Also, you should use a separate bathroom, if available.    Animals  You should restrict contact with pets and other animals while you are sick with COVID-19, just like you would around other people. When possible, have another member of your household care for your animals while you are sick. If you are sick with COVID-19, avoid contact with your pet, including petting, snuggling, being kissed or licked, and sharing food. If you must care for your pet or be around animals while you are sick, wash your hands before and after you interact with pets and wear a facemask. See COVID-19 and Animals for more information.    Call ahead before visiting your doctor  If you have a medical  appointment, call the healthcare provider and tell them that you have or may have COVID-19. This information will help the healthcare provider’s office take steps to keep other people from getting infected or exposed.    Wear a facemask  You should wear a facemask when you are around other people (e.g., sharing a room or vehicle) or pets and before you enter a healthcare provider’s office.     If you are not able to wear a facemask (for example, because it causes trouble breathing), then people who live with you should not stay in the same room with you, or they should wear a facemask if they enter your room.    Cover your coughs and sneezes  • Cover your mouth and nose with a tissue when you cough or sneeze.   • Throw used tissues in a lined trash can.   • Immediately wash your hands with soap and water for at least 20 seconds or, if soap and water are not available, clean your hands with an alcohol-based hand  that contains at least 60% alcohol.    Clean your hands often  • Wash your hands often with soap and water for at least 20 seconds, especially after blowing your nose, coughing, or sneezing; going to the bathroom; and before eating or preparing food.     • If soap and water are not readily available, use an alcohol-based hand  with at least 60% alcohol, covering all surfaces of your hands and rubbing them together until they feel dry.    • Soap and water are the best option if hands are visibly dirty. Avoid touching your eyes, nose, and mouth with unwashed hands.    Avoid sharing personal household items  • You should not share dishes, drinking glasses, cups, eating utensils, towels, or bedding with other people or pets in your home.   • After using these items, they should be washed thoroughly with soap and water.    Clean all “high-touch” surfaces everyday  • High touch surfaces include counters, tabletops, doorknobs, bathroom fixtures, toilets, phones, keyboards, tablets, and bedside  tables.   • Also, clean any surfaces that may have blood, stool, or body fluids on them.   • Use a household cleaning spray or wipe, according to the label instructions. Labels contain instructions for safe and effective use of the cleaning product, including precautions you should take when applying the product, such as wearing gloves and making sure you have good ventilation during use of the product.    Monitor your symptoms  • Seek prompt medical attention if your illness is worsening (e.g., difficulty breathing).   • Before seeking care, call your healthcare provider and tell them that you have, or are being evaluated for, COVID-19.   • Put on a facemask before you enter the facility.     • These steps will help the healthcare provider’s office to keep other people in the office or waiting room from getting infected or exposed.   • Persons who are placed under active monitoring or facilitated self-monitoring should follow instructions provided by their local health department or occupational health professionals, as appropriate.  • If you have a medical emergency and need to call 911, notify the dispatch personnel that you have, or are being evaluated for COVID-19. If possible, put on a facemask before emergency medical services arrive.    Discontinuing home isolation  Patients with confirmed COVID-19 should remain under home isolation precautions until the risk of secondary transmission to others is thought to be low. The decision to discontinue home isolation precautions should be made on a case-by-case basis, in consultation with healthcare providers and state and local health departments.    The below content are for household members, intimate partners, and caregivers of a patient with symptomatic laboratory-confirmed COVID-19 or a patient under investigation:    Household members, intimate partners, and caregivers may have close contact with a person with symptomatic, laboratory-confirmed COVID-19 or a  person under investigation.     Close contacts should monitor their health; they should call their healthcare provider right away if they develop symptoms suggestive of COVID-19 (e.g., fever, cough, shortness of breath)     Close contacts should also follow these recommendations:  • Make sure that you understand and can help the patient follow their healthcare provider’s instructions for medication(s) and care. You should help the patient with basic needs in the home and provide support for getting groceries, prescriptions, and other personal needs.  • Monitor the patient’s symptoms. If the patient is getting sicker, call his or her healthcare provider and tell them that the patient has laboratory-confirmed COVID-19. This will help the healthcare provider’s office take steps to keep other people in the office or waiting room from getting infected. Ask the healthcare provider to call the local or [facility] health department for additional guidance. If the patient has a medical emergency and you need to call 911, notify the dispatch personnel that the patient has, or is being evaluated for COVID-19.  • Household members should stay in another room or be  from the patient as much as possible. Household members should use a separate bedroom and bathroom, if available.  • Prohibit visitors who do not have an essential need to be in the home.  • Household members should care for any pets in the home. Do not handle pets or other animals while sick.  For more information, see COVID-19 and Animals.  • Make sure that shared spaces in the home have good air flow, such as by an air conditioner or an opened window, weather permitting.  • Perform hand hygiene frequently. Wash your hands often with soap and water for at least 20 seconds or use an alcohol-based hand  that contains 60 to 95% alcohol, covering all surfaces of your hands and rubbing them together until they feel dry. Soap and water should be used  preferentially if hands are visibly dirty.  • Avoid touching your eyes, nose, and mouth with unwashed hands.  • The patient should wear a facemask when you are around other people. If the patient is not able to wear a facemask (for example, because it causes trouble breathing), you, as the caregiver, should wear a mask when you are in the same room as the patient.  • Wear a disposable facemask and gloves when you touch or have contact with the patient’s blood, stool, or body fluids, such as saliva, sputum, nasal mucus, vomit, or urine.   o Throw out disposable facemasks and gloves after using them. Do not reuse.  o When removing personal protective equipment, first remove and dispose of gloves. Then, immediately clean your hands with soap and water or alcohol-based hand . Next, remove and dispose of facemask, and immediately clean your hands again with soap and water or alcohol-based hand .  • Avoid sharing household items with the patient. You should not share dishes, drinking glasses, cups, eating utensils, towels, bedding, or other items. After the patient uses these items, you should wash them thoroughly (see below “Wash laundry thoroughly”).  • Clean all “high-touch” surfaces, such as counters, tabletops, doorknobs, bathroom fixtures, toilets, phones, keyboards, tablets, and bedside tables, every day. Also, clean any surfaces that may have blood, stool, or body fluids on them.   o Use a household cleaning spray or wipe, according to the label instructions. Labels contain instructions for safe and effective use of the cleaning product including precautions you should take when applying the product, such as wearing gloves and making sure you have good ventilation during use of the product.  • Wash laundry thoroughly.   o Immediately remove and wash clothes or bedding that have blood, stool, or body fluids on them.  o Wear disposable gloves while handling soiled items and keep soiled items away  from your body. Clean your hands (with soap and water or an alcohol-based hand ) immediately after removing your gloves.  o Read and follow directions on labels of laundry or clothing items and detergent. In general, using a normal laundry detergent according to washing machine instructions and dry thoroughly using the warmest temperatures recommended on the clothing label.  • Place all used disposable gloves, facemasks, and other contaminated items in a lined container before disposing of them with other household waste. Clean your hands (with soap and water or an alcohol-based hand ) immediately after handling these items. Soap and water should be used preferentially if hands are visibly dirty.  • Discuss any additional questions with your state or local health department or healthcare provider.    Adapted from information provided by the Centers for Disease Control and Prevention.  For more information, visit https://www.cdc.gov/coronavirus/2019-ncov/hcp/guidance-prevent-spread.htmlFat and Cholesterol Restricted Eating Plan  Getting too much fat and cholesterol in your diet may cause health problems. Choosing the right foods helps keep your fat and cholesterol at normal levels. This can keep you from getting certain diseases.  Your doctor may recommend an eating plan that includes:  · Total fat: ______% or less of total calories a day.  · Saturated fat: ______% or less of total calories a day.  · Cholesterol: less than _________mg a day.  · Fiber: ______g a day.  What are tips for following this plan?  Meal planning  · At meals, divide your plate into four equal parts:  ? Fill one-half of your plate with vegetables and green salads.  ? Fill one-fourth of your plate with whole grains.  ? Fill one-fourth of your plate with low-fat (lean) protein foods.  · Eat fish that is high in omega-3 fats at least two times a week. This includes mackerel, tuna, sardines, and salmon.  · Eat foods that are high  "in fiber, such as whole grains, beans, apples, broccoli, carrots, peas, and barley.  General tips    · Work with your doctor to lose weight if you need to.  · Avoid:  ? Foods with added sugar.  ? Fried foods.  ? Foods with partially hydrogenated oils.  · Limit alcohol intake to no more than 1 drink a day for nonpregnant women and 2 drinks a day for men. One drink equals 12 oz of beer, 5 oz of wine, or 1½ oz of hard liquor.  Reading food labels  · Check food labels for:  ? Trans fats.  ? Partially hydrogenated oils.  ? Saturated fat (g) in each serving.  ? Cholesterol (mg) in each serving.  ? Fiber (g) in each serving.  · Choose foods with healthy fats, such as:  ? Monounsaturated fats.  ? Polyunsaturated fats.  ? Omega-3 fats.  · Choose grain products that have whole grains. Look for the word \"whole\" as the first word in the ingredient list.  Cooking  · Cook foods using low-fat methods. These include baking, boiling, grilling, and broiling.  · Eat more home-cooked foods. Eat at restaurants and buffets less often.  · Avoid cooking using saturated fats, such as butter, cream, palm oil, palm kernel oil, and coconut oil.  Recommended foods    Fruits  · All fresh, canned (in natural juice), or frozen fruits.  Vegetables  · Fresh or frozen vegetables (raw, steamed, roasted, or grilled). Green salads.  Grains  · Whole grains, such as whole wheat or whole grain breads, crackers, cereals, and pasta. Unsweetened oatmeal, bulgur, barley, quinoa, or brown rice. Corn or whole wheat flour tortillas.  Meats and other protein foods  · Ground beef (85% or leaner), grass-fed beef, or beef trimmed of fat. Skinless chicken or turkey. Ground chicken or turkey. Pork trimmed of fat. All fish and seafood. Egg whites. Dried beans, peas, or lentils. Unsalted nuts or seeds. Unsalted canned beans. Nut butters without added sugar or oil.  Dairy  · Low-fat or nonfat dairy products, such as skim or 1% milk, 2% or reduced-fat cheeses, low-fat and " fat-free ricotta or cottage cheese, or plain low-fat and nonfat yogurt.  Fats and oils  · Tub margarine without trans fats. Light or reduced-fat mayonnaise and salad dressings. Avocado. Olive, canola, sesame, or safflower oils.  The items listed above may not be a complete list of foods and beverages you can eat. Contact a dietitian for more information.  Foods to avoid  Fruits  · Canned fruit in heavy syrup. Fruit in cream or butter sauce. Fried fruit.  Vegetables  · Vegetables cooked in cheese, cream, or butter sauce. Fried vegetables.  Grains  · White bread. White pasta. White rice. Cornbread. Bagels, pastries, and croissants. Crackers and snack foods that contain trans fat and hydrogenated oils.  Meats and other protein foods  · Fatty cuts of meat. Ribs, chicken wings, jessica, sausage, bologna, salami, chitterlings, fatback, hot dogs, bratwurst, and packaged lunch meats. Liver and organ meats. Whole eggs and egg yolks. Chicken and turkey with skin. Fried meat.  Dairy  · Whole or 2% milk, cream, half-and-half, and cream cheese. Whole milk cheeses. Whole-fat or sweetened yogurt. Full-fat cheeses. Nondairy creamers and whipped toppings. Processed cheese, cheese spreads, and cheese curds.  Beverages  · Alcohol. Sugar-sweetened drinks such as sodas, lemonade, and fruit drinks.  Fats and oils  · Butter, stick margarine, lard, shortening, ghee, or jessica fat. Coconut, palm kernel, and palm oils.  Sweets and desserts  · Corn syrup, sugars, honey, and molasses. Candy. Jam and jelly. Syrup. Sweetened cereals. Cookies, pies, cakes, donuts, muffins, and ice cream.  The items listed above may not be a complete list of foods and beverages you should avoid. Contact a dietitian for more information.  Summary  · Choosing the right foods helps keep your fat and cholesterol at normal levels. This can keep you from getting certain diseases.  · At meals, fill one-half of your plate with vegetables and green salads.  · Eat high-fiber  foods, like whole grains, beans, apples, carrots, peas, and barley.  · Limit added sugar, saturated fats, alcohol, and fried foods.  This information is not intended to replace advice given to you by your health care provider. Make sure you discuss any questions you have with your health care provider.  Document Revised: 08/21/2019 Document Reviewed: 09/04/2018  [employer] Patient Education © 2020 [employer]  BMI for Adults  What is BMI?  Body mass index (BMI) is a number that is calculated from a person's weight and height. BMI can help estimate how much of a person's weight is composed of fat. BMI does not measure body fat directly. Rather, it is an alternative to procedures that directly measure body fat, which can be difficult and expensive.  BMI can help identify people who may be at higher risk for certain medical problems.  What are BMI measurements used for?  BMI is used as a screening tool to identify possible weight problems. It helps determine whether a person is obese, overweight, a healthy weight, or underweight.  BMI is useful for:  · Identifying a weight problem that may be related to a medical condition or may increase the risk for medical problems.  · Promoting changes, such as changes in diet and exercise, to help reach a healthy weight. BMI screening can be repeated to see if these changes are working.  How is BMI calculated?  BMI involves measuring your weight in relation to your height. Both height and weight are measured, and the BMI is calculated from those numbers. This can be done either in English ([place] or metric measurements. Note that charts and online BMI calculators are available to help you find your BMI quickly and easily without having to do these calculations yourself.  To calculate your BMI in English ([place] measurements:    1. Measure your weight in pounds (lb).  2. Multiply the number of pounds by 703.  ? For example, for a person who weighs 180 lb, multiply that number by 703,  "which equals 126,540.  3. Measure your height in inches. Then multiply that number by itself to get a measurement called \"inches squared.\"  ? For example, for a person who is 70 inches tall, the \"inches squared\" measurement is 70 inches x 70 inches, which equals 4,900 inches squared.  4. Divide the total from step 2 (number of lb x 703) by the total from step 3 (inches squared): 126,540 ÷ 4,900 = 25.8. This is your BMI.  To calculate your BMI in metric measurements:  1. Measure your weight in kilograms (kg).  2. Measure your height in meters (m). Then multiply that number by itself to get a measurement called \"meters squared.\"  ? For example, for a person who is 1.75 m tall, the \"meters squared\" measurement is 1.75 m x 1.75 m, which is equal to 3.1 meters squared.  3. Divide the number of kilograms (your weight) by the meters squared number. In this example: 70 ÷ 3.1 = 22.6. This is your BMI.  What do the results mean?  BMI charts are used to identify whether you are underweight, normal weight, overweight, or obese. The following guidelines will be used:  · Underweight: BMI less than 18.5.  · Normal weight: BMI between 18.5 and 24.9.  · Overweight: BMI between 25 and 29.9.  · Obese: BMI of 30 or above.  Keep these notes in mind:  · Weight includes both fat and muscle, so someone with a muscular build, such as an athlete, may have a BMI that is higher than 24.9. In cases like these, BMI is not an accurate measure of body fat.  · To determine if excess body fat is the cause of a BMI of 25 or higher, further assessments may need to be done by a health care provider.  · BMI is usually interpreted in the same way for men and women.  Where to find more information  For more information about BMI, including tools to quickly calculate your BMI, go to these websites:  · Centers for Disease Control and Prevention: www.cdc.gov  · American Heart Association: www.heart.org  · National Heart, Lung, and Blood [place]: " www.nhlbi.nih.gov  Summary  · Body mass index (BMI) is a number that is calculated from a person's weight and height.  · BMI may help estimate how much of a person's weight is composed of fat. BMI can help identify those who may be at higher risk for certain medical problems.  · BMI can be measured using English measurements or metric measurements.  · BMI charts are used to identify whether you are underweight, normal weight, overweight, or obese.  This information is not intended to replace advice given to you by your health care provider. Make sure you discuss any questions you have with your health care provider.  Document Revised: 09/09/2020 Document Reviewed: 07/17/2020  [employer] Patient Education © 2020 [employer]

## 2020-12-14 NOTE — PROGRESS NOTES
Subjective   Raghav Warner is a 70 y.o. male     Chief Complaint   Patient presents with   • Follow-up   • Coronary Artery Disease       HPI    Problem list:    1. CAD  1.1 C 11/15/2000 - Stent x 2 LAD  2. Hypertension  2.1 Echo 11/3/15 - EF > 65%; DD I; trace MR and TR; mild CT; PA 40-45  3. Dyslipidemia     Patient is a 70-year-old male who presents today for follow-up.  He denies any chest pain, pressure, dizziness, presyncope, syncope, orthopnea, PND or edema.  He says he has a rare vibration sensation at rest but is very very infrequent.  He says he has no shortness of breath with activity and him and his wife are still walking 4 to 5 miles every day.  He did bring his labs in from his PCP.  He says he has been disheartened as his granddaughter who made straight his microbiology has been trying to get into medical school and she has not been able so far.    Current Outpatient Medications on File Prior to Visit   Medication Sig Dispense Refill   • aspirin 325 MG tablet Take 325 mg by mouth Daily.     • folic acid (FOLVITE) 400 MCG tablet Take 400 mcg by mouth Daily.     • metoprolol tartrate (LOPRESSOR) 50 MG tablet Take 50 mg by mouth. .5 tablet bid  3   • nitroglycerin (NITROSTAT) 0.4 MG SL tablet Place 0.4 mg under the tongue Every 5 (Five) Minutes As Needed.     • ramipril (ALTACE) 5 MG capsule Take 5 mg by mouth Daily.  5   • simvastatin (ZOCOR) 20 MG tablet Take 20 mg by mouth Every Other Day.  5     No current facility-administered medications on file prior to visit.        ALLERGIES    Plavix [clopidogrel bisulfate] and Prednisone    Past Medical History:   Diagnosis Date   • Bone spur     neck    • Bulging lumbar disc    • CAD (coronary artery disease)    • Dyslipidemia    • Hypertension    • Palpitations        Social History     Socioeconomic History   • Marital status:      Spouse name: Not on file   • Number of children: Not on file   • Years of education: Not on file   • Highest  "education level: Not on file   Tobacco Use   • Smoking status: Former Smoker   • Smokeless tobacco: Never Used   Substance and Sexual Activity   • Alcohol use: No   • Drug use: No       Family History   Problem Relation Age of Onset   • Heart attack Other    • Diabetes Other    • Heart disease Other         CABG   • Hypertension Other    • Diabetes Mother    • Hypertension Mother    • Diabetes Father    • Hypertension Father    • Heart disease Father    • Hypertension Sister        Review of Systems   Constitutional: Negative for diaphoresis and fatigue.   HENT: Negative.    Eyes: Positive for visual disturbance (Reading glasses).   Respiratory: Negative for chest tightness and shortness of breath.    Cardiovascular: Positive for palpitations (Sometimes has flutters when resting, seldom; just a quiver ). Negative for chest pain and leg swelling.   Gastrointestinal: Negative for constipation, diarrhea, nausea and vomiting.   Endocrine: Positive for heat intolerance (States he gets hot very easily ). Negative for cold intolerance.   Genitourinary: Positive for urgency (Enlarged prostate ). Negative for difficulty urinating, dysuria, frequency and hematuria.   Musculoskeletal: Positive for arthralgias. Negative for back pain and neck pain.   Allergic/Immunologic: Positive for environmental allergies (Seasonal ). Negative for food allergies.   Neurological: Negative for dizziness, syncope and light-headedness.   Hematological: Does not bruise/bleed easily.   Psychiatric/Behavioral: Negative for sleep disturbance (Denies waking up SoA/smothering).       Objective   /70 Comment: took am meds at 7:15am  Pulse 68   Ht 179.1 cm (70.5\")   Wt 85.3 kg (188 lb)   SpO2 99%   BMI 26.59 kg/m²   Vitals:    12/14/20 0739   BP: 153/70   Pulse: 68   SpO2: 99%   Weight: 85.3 kg (188 lb)   Height: 179.1 cm (70.5\")      Lab Results (most recent)     None        Physical Exam  Vitals signs reviewed.   Constitutional:       " General: He is awake.      Appearance: Normal appearance. He is well-developed, well-groomed and overweight.   HENT:      Head: Normocephalic.   Eyes:      General: Lids are normal.      Comments: Wears glasses    Neck:      Vascular: No carotid bruit, hepatojugular reflux or JVD.   Cardiovascular:      Rate and Rhythm: Normal rate and regular rhythm.      Pulses:           Radial pulses are 2+ on the right side and 2+ on the left side.        Dorsalis pedis pulses are 2+ on the right side and 2+ on the left side.        Posterior tibial pulses are 2+ on the right side and 2+ on the left side.      Heart sounds: Normal heart sounds.   Pulmonary:      Effort: Pulmonary effort is normal.      Breath sounds: Normal breath sounds and air entry.   Abdominal:      General: Bowel sounds are normal.      Palpations: Abdomen is soft.   Musculoskeletal:      Right lower leg: No edema.      Left lower leg: No edema.   Skin:     General: Skin is warm and dry.   Neurological:      Mental Status: He is alert and oriented to person, place, and time.   Psychiatric:         Attention and Perception: Attention and perception normal.         Mood and Affect: Mood and affect normal.         Speech: Speech normal.         Behavior: Behavior normal. Behavior is cooperative.         Thought Content: Thought content normal.         Cognition and Memory: Cognition and memory normal.         Judgment: Judgment normal.         Procedure   Procedures         Assessment/Plan      Diagnosis Plan   1. Coronary artery disease involving native coronary artery of native heart without angina pectoris     2. Essential hypertension     3. Palpitations     4. Dyslipidemia         Return in about 1 year (around 12/14/2021).    CAD-patient's on aspirin, beta and statin.  Hypertension-patient's doing well however he just took his medications this morning.  He is on metoprolol and ramipril.  Palpitations-stable on beta-blocker.  Dyslipidemia-patient is on  simvastatin and his last LDL was 84.  He will continue his medication regimen.  He will follow-up in 1 year or sooner if any changes.    We did not do an EKG as he had one at the surgical center prior to colonoscopy which we have requested.       Raghav Beckerford  reports that he has quit smoking. He has never used smokeless tobacco.    Patient's Body mass index is 26.59 kg/m². BMI is above normal parameters. Recommendations include: educational material.    Advance Care Planning   ACP discussion was held with the patient during this visit. Patient has an advance directive (not in EMR), copy requested.      Electronically signed by:

## 2021-05-18 ENCOUNTER — LAB (OUTPATIENT)
Dept: LAB | Facility: HOSPITAL | Age: 71
End: 2021-05-18

## 2021-05-18 ENCOUNTER — TRANSCRIBE ORDERS (OUTPATIENT)
Dept: LAB | Facility: HOSPITAL | Age: 71
End: 2021-05-18

## 2021-05-18 DIAGNOSIS — I10 ESSENTIAL HYPERTENSION: Primary | ICD-10-CM

## 2021-05-18 DIAGNOSIS — R73.01 ELEVATED FASTING BLOOD SUGAR: ICD-10-CM

## 2021-05-18 DIAGNOSIS — I10 ESSENTIAL HYPERTENSION: ICD-10-CM

## 2021-05-18 LAB
ALBUMIN SERPL-MCNC: 4.45 G/DL (ref 3.5–5.2)
ALBUMIN/GLOB SERPL: 1.5 G/DL
ALP SERPL-CCNC: 71 U/L (ref 39–117)
ALT SERPL W P-5'-P-CCNC: 24 U/L (ref 1–41)
ANION GAP SERPL CALCULATED.3IONS-SCNC: 8.9 MMOL/L (ref 5–15)
AST SERPL-CCNC: 25 U/L (ref 1–40)
BILIRUB SERPL-MCNC: 0.7 MG/DL (ref 0–1.2)
BUN SERPL-MCNC: 16 MG/DL (ref 8–23)
BUN/CREAT SERPL: 15 (ref 7–25)
CALCIUM SPEC-SCNC: 9.8 MG/DL (ref 8.6–10.5)
CHLORIDE SERPL-SCNC: 105 MMOL/L (ref 98–107)
CHOLEST SERPL-MCNC: 150 MG/DL (ref 0–200)
CO2 SERPL-SCNC: 27.1 MMOL/L (ref 22–29)
CREAT SERPL-MCNC: 1.07 MG/DL (ref 0.76–1.27)
GFR SERPL CREATININE-BSD FRML MDRD: 68 ML/MIN/1.73
GLOBULIN UR ELPH-MCNC: 3.1 GM/DL
GLUCOSE SERPL-MCNC: 94 MG/DL (ref 65–99)
HBA1C MFR BLD: 5.8 % (ref 4.8–5.6)
HDLC SERPL-MCNC: 55 MG/DL (ref 40–60)
LDLC SERPL CALC-MCNC: 80 MG/DL (ref 0–100)
LDLC/HDLC SERPL: 1.45 {RATIO}
POTASSIUM SERPL-SCNC: 5 MMOL/L (ref 3.5–5.2)
PROT SERPL-MCNC: 7.5 G/DL (ref 6–8.5)
SODIUM SERPL-SCNC: 141 MMOL/L (ref 136–145)
TRIGL SERPL-MCNC: 76 MG/DL (ref 0–150)
VLDLC SERPL-MCNC: 15 MG/DL (ref 5–40)

## 2021-05-18 PROCEDURE — 36415 COLL VENOUS BLD VENIPUNCTURE: CPT

## 2021-05-18 PROCEDURE — 80053 COMPREHEN METABOLIC PANEL: CPT

## 2021-05-18 PROCEDURE — 82043 UR ALBUMIN QUANTITATIVE: CPT

## 2021-05-18 PROCEDURE — 80061 LIPID PANEL: CPT

## 2021-05-18 PROCEDURE — 83036 HEMOGLOBIN GLYCOSYLATED A1C: CPT

## 2021-05-19 LAB — ALBUMIN UR-MCNC: 1.8 MG/DL

## 2021-12-14 ENCOUNTER — OFFICE VISIT (OUTPATIENT)
Dept: CARDIOLOGY | Facility: CLINIC | Age: 71
End: 2021-12-14

## 2021-12-14 VITALS
DIASTOLIC BLOOD PRESSURE: 80 MMHG | TEMPERATURE: 97.2 F | SYSTOLIC BLOOD PRESSURE: 139 MMHG | WEIGHT: 191 LBS | HEIGHT: 71 IN | OXYGEN SATURATION: 96 % | BODY MASS INDEX: 26.74 KG/M2 | HEART RATE: 62 BPM

## 2021-12-14 DIAGNOSIS — I25.10 CORONARY ARTERY DISEASE INVOLVING NATIVE CORONARY ARTERY OF NATIVE HEART WITHOUT ANGINA PECTORIS: Primary | ICD-10-CM

## 2021-12-14 DIAGNOSIS — R00.2 PALPITATIONS: ICD-10-CM

## 2021-12-14 DIAGNOSIS — E78.5 DYSLIPIDEMIA: ICD-10-CM

## 2021-12-14 DIAGNOSIS — I10 PRIMARY HYPERTENSION: ICD-10-CM

## 2021-12-14 PROCEDURE — 93000 ELECTROCARDIOGRAM COMPLETE: CPT | Performed by: NURSE PRACTITIONER

## 2021-12-14 PROCEDURE — 99214 OFFICE O/P EST MOD 30 MIN: CPT | Performed by: NURSE PRACTITIONER

## 2021-12-14 RX ORDER — SIMVASTATIN 20 MG
20 TABLET ORAL EVERY OTHER DAY
Qty: 90 TABLET | Refills: 3 | Status: SHIPPED | OUTPATIENT
Start: 2021-12-14 | End: 2023-03-06

## 2021-12-14 RX ORDER — METOPROLOL TARTRATE 50 MG/1
50 TABLET, FILM COATED ORAL 2 TIMES DAILY
Qty: 90 TABLET | Refills: 3 | Status: SHIPPED | OUTPATIENT
Start: 2021-12-14 | End: 2021-12-14 | Stop reason: SDUPTHER

## 2021-12-14 NOTE — PATIENT INSTRUCTIONS
Advance Care Planning and Advance Directives     You make decisions on a daily basis - decisions about where you want to live, your career, your home, your life. Perhaps one of the most important decisions you face is your choice for future medical care. Take time to talk with your family and your healthcare team and start planning today.  Advance Care Planning is a process that can help you:  · Understand possible future healthcare decisions in light of your own experiences  · Reflect on those decision in light of your goals and values  · Discuss your decisions with those closest to you and the healthcare professionals that care for you  · Make a plan by creating a document that reflects your wishes    Surrogate Decision Maker  In the event of a medical emergency, which has left you unable to communicate or to make your own decisions, you would need someone to make decisions for you.  It is important to discuss your preferences for medical treatment with this person while you are in good health.     Qualities of a surrogate decision maker:  • Willing to take on this role and responsibility  • Knows what you want for future medical care  • Willing to follow your wishes even if they don't agree with them  • Able to make difficult medical decisions under stressful circumstances    Advance Directives  These are legal documents you can create that will guide your healthcare team and decision maker(s) when needed. These documents can be stored in the electronic medical record.    · Living Will - a legal document to guide your care if you have a terminal condition or a serious illness and are unable to communicate. States vary by statute in document names/types, but most forms may include one or more of the following:        -  Directions regarding life-prolonging treatments        -  Directions regarding artificially provided nutrition/hydration        -  Choosing a healthcare decision maker        -  Direction  regarding organ/tissue donation    · Durable Power of  for Healthcare - this document names an -in-fact to make medical decisions for you, but it may also allow this person to make personal and financial decisions for you. Please seek the advice of an  if you need this type of document.    **Advance Directives are not required and no one may discriminate against you if you do not sign one.    Medical Orders  Many states allow specific forms/orders signed by your physician to record your wishes for medical treatment in your current state of health. This form, signed in personal communication with your physician, addresses resuscitation and other medical interventions that you may or may not want.      For more information or to schedule a time with a [facility] Advance Care Planning Facilitator contact: [email or web address] or call [phone] and someone will contact you directly.

## 2021-12-14 NOTE — PROGRESS NOTES
Subjective   Raghav Warner is a 71 y.o. male     Chief Complaint   Patient presents with   • Follow-up   • Coronary Artery Disease       HPI    Problem list:    1. CAD  1.1 C 11/15/2000 - Stent x 2 LAD  2. Hypertension  2.1 Echo 11/3/15 - EF > 65%; DD I; trace MR and TR; mild VT; PA 40-45  3. Dyslipidemia     Patient is a 71-year-old male who presents today for a follow-up.  He denies any chest pain, pressure, dizziness, presyncope, syncope, orthopnea, PND or edema.  He does have palpitations he says they are very infrequent and of the same he has had for many years.  Patient denies any shortness of breath.  He walks anywhere from 3 miles a day.  He says he fatigues but only because of the back pain wears him out.    We went over his blood work.  His last LDL was 97.  Patient states he did have angina whenever he had his stents placed and he is not had any of those symptoms.    Current Outpatient Medications on File Prior to Visit   Medication Sig Dispense Refill   • aspirin 325 MG tablet Take 325 mg by mouth Daily.     • folic acid (FOLVITE) 400 MCG tablet Take 400 mcg by mouth Daily.     • nitroglycerin (NITROSTAT) 0.4 MG SL tablet Place 0.4 mg under the tongue Every 5 (Five) Minutes As Needed.     • ramipril (ALTACE) 10 MG capsule Take 10 mg by mouth Daily.  5   • [DISCONTINUED] metoprolol tartrate (LOPRESSOR) 50 MG tablet Take 50 mg by mouth. .5 tablet bid  3   • [DISCONTINUED] simvastatin (ZOCOR) 20 MG tablet Take 20 mg by mouth Every Other Day.  5     No current facility-administered medications on file prior to visit.       ALLERGIES    Latex, Plavix [clopidogrel bisulfate], and Prednisone    Past Medical History:   Diagnosis Date   • Bone spur     neck    • Bulging lumbar disc    • CAD (coronary artery disease)    • Dyslipidemia    • Hypertension    • Palpitations        Social History     Socioeconomic History   • Marital status:    Tobacco Use   • Smoking status: Former Smoker   • Smokeless  tobacco: Never Used   Substance and Sexual Activity   • Alcohol use: No   • Drug use: No       Family History   Problem Relation Age of Onset   • Heart attack Other    • Diabetes Other    • Heart disease Other         CABG   • Hypertension Other    • Diabetes Mother    • Hypertension Mother    • Diabetes Father    • Hypertension Father    • Heart disease Father    • Hypertension Sister        Review of Systems   Constitutional: Positive for fatigue (tired easier , he walks 3-5 miles a day; back pain wears him out ). Negative for appetite change, chills, diaphoresis and fever.   HENT: Negative for congestion, rhinorrhea and sore throat.    Eyes: Positive for visual disturbance (reading glasses).   Respiratory: Negative for cough, chest tightness, shortness of breath and wheezing.    Cardiovascular: Positive for palpitations (fluttering; very infreq, same for very many years ). Negative for chest pain and leg swelling.   Gastrointestinal: Negative for abdominal pain, blood in stool, constipation, diarrhea, nausea and vomiting.   Endocrine: Negative for cold intolerance and heat intolerance.   Genitourinary: Positive for difficulty urinating (slow stream due to enlarged prostate ) and frequency (several times in the day and worse at night ). Negative for dysuria, hematuria and urgency.   Musculoskeletal: Positive for arthralgias (through out the body ), back pain (lower ), neck pain (back of the neck ) and neck stiffness (stiffness at times depends on what he has been doing that day ). Negative for joint swelling.   Skin: Negative for color change, pallor, rash and wound.   Allergic/Immunologic: Negative for environmental allergies and food allergies.   Neurological: Negative for dizziness, weakness, light-headedness, numbness and headaches.   Hematological: Does not bruise/bleed easily.   Psychiatric/Behavioral: Positive for sleep disturbance (hard to stay asleep he tosses and tuns alot at night he gets maybe 4 hrs a  "night , pt has alot of back and neck pain ).       Objective   /80 (BP Location: Left arm)   Pulse 62   Temp 97.2 °F (36.2 °C)   Ht 179.1 cm (70.5\")   Wt 86.6 kg (191 lb)   SpO2 96%   BMI 27.02 kg/m²   Vitals:    12/14/21 0750   BP: 139/80   BP Location: Left arm   Pulse: 62   Temp: 97.2 °F (36.2 °C)   SpO2: 96%   Weight: 86.6 kg (191 lb)   Height: 179.1 cm (70.5\")      Lab Results (most recent)     None        Physical Exam  Vitals reviewed.   Constitutional:       General: He is awake.      Appearance: Normal appearance. He is well-developed, well-groomed and overweight.   HENT:      Head: Normocephalic.   Eyes:      General: Lids are normal.   Neck:      Vascular: No carotid bruit, hepatojugular reflux or JVD.   Cardiovascular:      Rate and Rhythm: Normal rate and regular rhythm.      Pulses:           Radial pulses are 2+ on the right side and 2+ on the left side.        Dorsalis pedis pulses are 2+ on the right side and 2+ on the left side.        Posterior tibial pulses are 2+ on the right side and 2+ on the left side.      Heart sounds: Normal heart sounds.   Pulmonary:      Effort: Pulmonary effort is normal.      Breath sounds: Normal breath sounds and air entry.   Abdominal:      General: Bowel sounds are normal.      Palpations: Abdomen is soft.   Musculoskeletal:      Right lower leg: No edema.      Left lower leg: No edema.   Skin:     General: Skin is warm and dry.   Neurological:      Mental Status: He is alert and oriented to person, place, and time.   Psychiatric:         Attention and Perception: Attention and perception normal.         Mood and Affect: Mood and affect normal.         Speech: Speech normal.         Behavior: Behavior normal. Behavior is cooperative.         Thought Content: Thought content normal.         Cognition and Memory: Cognition and memory normal.         Judgment: Judgment normal.         Procedure     ECG 12 Lead    Date/Time: 12/14/2021 8:05 AM  Performed " by: Di Paz APRN  Authorized by: Di Paz APRN   Comparison: compared with previous ECG from 12/12/2019  Similar to previous ECG  Rhythm: sinus rhythm  Rate: normal  BPM: 62  QRS axis: normal  Other findings: low voltage    Clinical impression: non-specific ECG                 Assessment/Plan      Diagnosis Plan   1. Coronary artery disease involving native coronary artery of native heart without angina pectoris  simvastatin (ZOCOR) 20 MG tablet    metoprolol tartrate (LOPRESSOR) 25 MG tablet   2. Primary hypertension  metoprolol tartrate (LOPRESSOR) 25 MG tablet   3. Dyslipidemia  simvastatin (ZOCOR) 20 MG tablet   4. Palpitations         Return in about 1 year (around 12/14/2022).    CAD-patient's on aspirin and statin as well as beta.  Hypertension-patient's on metoprolol and Altace and per his report he does very well.  Dyslipidemia-stable on simvastatin with LDL of 97.  Palpitations-stable on beta-blocker.  Patient will continue his medication regimen.  He will follow-up in 1 year or sooner if any changes.       Raghav Warner  reports that he has quit smoking. He has never used smokeless tobacco.. Advance Care Planning   ACP discussion was declined by the patient. Patient has an advance directive (not in EMR), copy requested. Patient brought in medicine list to appointment, it's been reviewed with patient and med list was updated in the chart.     Electronically signed by:

## 2022-12-14 ENCOUNTER — OFFICE VISIT (OUTPATIENT)
Dept: CARDIOLOGY | Facility: CLINIC | Age: 72
End: 2022-12-14

## 2022-12-14 VITALS
WEIGHT: 184 LBS | HEIGHT: 71 IN | DIASTOLIC BLOOD PRESSURE: 68 MMHG | SYSTOLIC BLOOD PRESSURE: 138 MMHG | OXYGEN SATURATION: 98 % | BODY MASS INDEX: 25.76 KG/M2 | HEART RATE: 64 BPM | TEMPERATURE: 98.1 F

## 2022-12-14 DIAGNOSIS — I51.89 GRADE I DIASTOLIC DYSFUNCTION: ICD-10-CM

## 2022-12-14 DIAGNOSIS — I25.10 CORONARY ARTERY DISEASE INVOLVING NATIVE CORONARY ARTERY OF NATIVE HEART WITHOUT ANGINA PECTORIS: Primary | ICD-10-CM

## 2022-12-14 DIAGNOSIS — R00.2 PALPITATIONS: ICD-10-CM

## 2022-12-14 DIAGNOSIS — E78.5 DYSLIPIDEMIA: ICD-10-CM

## 2022-12-14 DIAGNOSIS — I10 PRIMARY HYPERTENSION: ICD-10-CM

## 2022-12-14 PROCEDURE — 93000 ELECTROCARDIOGRAM COMPLETE: CPT | Performed by: NURSE PRACTITIONER

## 2022-12-14 PROCEDURE — 99214 OFFICE O/P EST MOD 30 MIN: CPT | Performed by: NURSE PRACTITIONER

## 2022-12-14 RX ORDER — TAMSULOSIN HYDROCHLORIDE 0.4 MG/1
1 CAPSULE ORAL AS NEEDED
COMMUNITY

## 2022-12-14 NOTE — PROGRESS NOTES
Subjective   Raghav Warner is a 72 y.o. male     Chief Complaint   Patient presents with   • Follow-up       HPI    Problem list:    1. CAD  1.1 LHC 11/15/2000 - Stent x 2 LAD  2. Hypertension  2.1 Echo 11/3/15 - EF > 65%; DD I; trace MR and TR; mild ND; PA 40-45  3. Dyslipidemia     Patient is a 72-year-old male who presents today for a follow-up.  He denies any chest pain, pressure, dizziness, presyncope, syncope, orthopnea, PND or edema.  Patient still has palpitations on occasion however he says they are much better.  He says he has maybe had 2 episodes since he was here a year ago.  He says he does walk 3 miles every day and has no shortness of breath.  He goes to [employer].    We went over his labs.  His last LDL was 94, triglycerides 93, A1c 5.4, creatinine was 1.03.  We went over EKG.    Current Outpatient Medications on File Prior to Visit   Medication Sig Dispense Refill   • aspirin 325 MG tablet Take 325 mg by mouth Daily.     • folic acid (FOLVITE) 400 MCG tablet Take 400 mcg by mouth Daily.     • metoprolol tartrate (LOPRESSOR) 25 MG tablet Take 1 tablet by mouth 2 (Two) Times a Day. 180 tablet 3   • nitroglycerin (NITROSTAT) 0.4 MG SL tablet Place 0.4 mg under the tongue Every 5 (Five) Minutes As Needed.     • ramipril (ALTACE) 10 MG capsule Take 10 mg by mouth Daily.  5   • simvastatin (ZOCOR) 20 MG tablet Take 1 tablet by mouth Every Other Day. 90 tablet 3   • tamsulosin (FLOMAX) 0.4 MG capsule 24 hr capsule Take 1 capsule by mouth As Needed.       No current facility-administered medications on file prior to visit.       ALLERGIES    Latex, Plavix [clopidogrel bisulfate], and Prednisone    Past Medical History:   Diagnosis Date   • Bone spur     neck    • Bulging lumbar disc    • CAD (coronary artery disease)    • Dyslipidemia    • Hypertension    • Palpitations        Social History     Socioeconomic History   • Marital status:    Tobacco Use   • Smoking status: Former   • Smokeless  tobacco: Never   Vaping Use   • Vaping Use: Never used   Substance and Sexual Activity   • Alcohol use: No   • Drug use: No       Family History   Problem Relation Age of Onset   • Heart attack Other    • Diabetes Other    • Heart disease Other         CABG   • Hypertension Other    • Diabetes Mother    • Hypertension Mother    • Diabetes Father    • Hypertension Father    • Heart disease Father    • Hypertension Sister        Review of Systems   Constitutional: Negative for appetite change, chills, diaphoresis, fatigue and fever.   HENT: Negative for congestion, rhinorrhea and sore throat.    Eyes: Positive for visual disturbance (reading glasses ).   Respiratory: Negative for cough, chest tightness, shortness of breath (walks 3 miles a day ) and wheezing.    Cardiovascular: Positive for palpitations (very little ( fluttering ) he states that he may have had 2 espoides since he was seen over a year ago; less). Negative for chest pain and leg swelling.   Gastrointestinal: Negative for abdominal pain, blood in stool, constipation, diarrhea, nausea and vomiting.   Endocrine: Negative for cold intolerance and heat intolerance.   Genitourinary: Positive for difficulty urinating (slow stream ) and frequency (several times a day and night ). Negative for dysuria, hematuria and urgency.   Musculoskeletal: Positive for arthralgias (throguhout the body ), back pain (chronic lower back ), neck pain (back of the neck ) and neck stiffness (stiffness at times ). Negative for joint swelling.   Skin: Negative for color change, pallor, rash and wound.   Allergic/Immunologic: Negative for environmental allergies and food allergies.   Neurological: Negative for dizziness, syncope, weakness, light-headedness, numbness and headaches.   Hematological: Bruises/bleeds easily (Brusies easy ).   Psychiatric/Behavioral: Positive for sleep disturbance (hard to stay asleep he will sleep for 2 hrs solid then he is up and can't get back to sleep  ").       Objective   /68 (BP Location: Left arm, Patient Position: Sitting)   Pulse 64   Temp 98.1 °F (36.7 °C)   Ht 179.1 cm (70.5\")   Wt 83.5 kg (184 lb)   SpO2 98%   BMI 26.03 kg/m²   Vitals:    12/14/22 0745   BP: 138/68   BP Location: Left arm   Patient Position: Sitting   Pulse: 64   Temp: 98.1 °F (36.7 °C)   SpO2: 98%   Weight: 83.5 kg (184 lb)   Height: 179.1 cm (70.5\")      Lab Results (most recent)     None        Physical Exam  Vitals reviewed.   Constitutional:       General: He is awake.      Appearance: Normal appearance. He is well-developed, well-groomed and overweight.   HENT:      Head: Normocephalic.   Eyes:      General: Lids are normal.   Neck:      Vascular: No carotid bruit, hepatojugular reflux or JVD.   Cardiovascular:      Rate and Rhythm: Normal rate and regular rhythm.      Pulses:           Radial pulses are 2+ on the right side and 2+ on the left side.        Dorsalis pedis pulses are 2+ on the right side and 2+ on the left side.        Posterior tibial pulses are 2+ on the right side and 2+ on the left side.      Heart sounds: Normal heart sounds.   Pulmonary:      Effort: Pulmonary effort is normal.      Breath sounds: Normal breath sounds and air entry.   Abdominal:      General: Bowel sounds are normal.      Palpations: Abdomen is soft.   Musculoskeletal:      Right lower leg: No edema.      Left lower leg: No edema.   Skin:     General: Skin is warm and dry.   Neurological:      Mental Status: He is alert and oriented to person, place, and time.   Psychiatric:         Attention and Perception: Attention and perception normal.         Mood and Affect: Mood and affect normal.         Speech: Speech normal.         Behavior: Behavior normal. Behavior is cooperative.         Thought Content: Thought content normal.         Cognition and Memory: Cognition and memory normal.         Judgment: Judgment normal.         Procedure     ECG 12 Lead    Date/Time: 12/14/2022 7:59 " AM  Performed by: Di Rangel APRN  Authorized by: Di Rangel APRN   Comparison: compared with previous ECG from 12/14/2021  Similar to previous ECG  Rhythm: sinus rhythm  Rate: normal  BPM: 64  QRS axis: normal  Other findings: low voltage    Clinical impression: non-specific ECG                 Assessment & Plan      Diagnosis Plan   1. Coronary artery disease involving native coronary artery of native heart without angina pectoris  ECG 12 Lead      2. Primary hypertension  ECG 12 Lead      3. Dyslipidemia        4. Palpitations  ECG 12 Lead      5. Grade I diastolic dysfunction            Return in about 1 year (around 12/14/2023).    CAD-patient's on aspirin, beta and statin.  Hypertension-patient's on metoprolol and Altace and doing well.  Dyslipidemia-patient's on Zocor last LDL was 93.  Palpitations-stable on beta-blocker.  Diastolic dysfunction-no signs of failure.  He will continue his medication regimen.  He will follow-up in 1 year or sooner if any changes.       Raghav Timmy  reports that he has quit smoking. He has never used smokeless tobacco..Advance Care Planning   ACP discussion was declined by the patient. Patient has an advance directive (not in EMR), copy requested. Patient brought in medicine list to appointment, it's been reviewed with patient and med list was updated in the chart.        Electronically signed by:

## 2023-03-03 DIAGNOSIS — I25.10 CORONARY ARTERY DISEASE INVOLVING NATIVE CORONARY ARTERY OF NATIVE HEART WITHOUT ANGINA PECTORIS: ICD-10-CM

## 2023-03-03 DIAGNOSIS — E78.5 DYSLIPIDEMIA: ICD-10-CM

## 2023-03-06 RX ORDER — SIMVASTATIN 20 MG
TABLET ORAL
Qty: 45 TABLET | Refills: 3 | Status: SHIPPED | OUTPATIENT
Start: 2023-03-06

## 2023-12-14 ENCOUNTER — OFFICE VISIT (OUTPATIENT)
Dept: CARDIOLOGY | Facility: CLINIC | Age: 73
End: 2023-12-14
Payer: MEDICARE

## 2023-12-14 VITALS
BODY MASS INDEX: 26.04 KG/M2 | HEART RATE: 62 BPM | HEIGHT: 71 IN | WEIGHT: 186 LBS | DIASTOLIC BLOOD PRESSURE: 77 MMHG | SYSTOLIC BLOOD PRESSURE: 136 MMHG | OXYGEN SATURATION: 99 %

## 2023-12-14 DIAGNOSIS — I10 PRIMARY HYPERTENSION: ICD-10-CM

## 2023-12-14 DIAGNOSIS — I25.10 CORONARY ARTERY DISEASE INVOLVING NATIVE CORONARY ARTERY OF NATIVE HEART WITHOUT ANGINA PECTORIS: Primary | ICD-10-CM

## 2023-12-14 DIAGNOSIS — E78.5 DYSLIPIDEMIA: ICD-10-CM

## 2023-12-14 PROCEDURE — 1160F RVW MEDS BY RX/DR IN RCRD: CPT | Performed by: PHYSICIAN ASSISTANT

## 2023-12-14 PROCEDURE — 93000 ELECTROCARDIOGRAM COMPLETE: CPT | Performed by: PHYSICIAN ASSISTANT

## 2023-12-14 PROCEDURE — 3075F SYST BP GE 130 - 139MM HG: CPT | Performed by: PHYSICIAN ASSISTANT

## 2023-12-14 PROCEDURE — 3078F DIAST BP <80 MM HG: CPT | Performed by: PHYSICIAN ASSISTANT

## 2023-12-14 PROCEDURE — 1159F MED LIST DOCD IN RCRD: CPT | Performed by: PHYSICIAN ASSISTANT

## 2023-12-14 PROCEDURE — 99213 OFFICE O/P EST LOW 20 MIN: CPT | Performed by: PHYSICIAN ASSISTANT

## 2023-12-14 RX ORDER — METOPROLOL TARTRATE 50 MG/1
25 TABLET, FILM COATED ORAL 2 TIMES DAILY
COMMUNITY
Start: 2023-09-20

## 2023-12-14 RX ORDER — NITROGLYCERIN 0.4 MG/1
TABLET SUBLINGUAL
Qty: 25 TABLET | Refills: 2 | Status: SHIPPED | OUTPATIENT
Start: 2023-12-14

## 2023-12-14 NOTE — PROGRESS NOTES
"Problem list     Subjective   Raghav Warner is a 73 y.o. male     Chief Complaint   Patient presents with    Follow-up     Yearly follow up    Palpitations     Patient reports has palpitation's \"every now and then but not often and has for years\"   Problem list:  1.  Coronary artery disease.  1.1.  Catheterization, 11/2000, with reported stenting x 2 the LAD.  2.  Preserved systolic function.  3.  Hypertension  4.  Dyslipidemia.    HPI  The patient presents in clinic today for routine evaluation and follow-up.  For the most part, the patient has done well over the past year.  He currently denies chest pain.  He has stable dyspnea.  He has no failure symptoms at this time.  He has rare palpitations, nothing of significance.  He certainly has no sustained dysrhythmic activity.  He typically is normotensive when monitored at home.  Laboratories have been followed by primary care provider previously.  Lipid parameters in particular have been mostly normal.  The last copy of laboratories that I see did suggest an LDL of 90.  We encouraged his goal would be at 70 or less.  He would prefer not to augment lipid management medication.  We will monitor this long-term.  The patient has no further complaints and feels that he is doing very well.  Of note, he does walk at least 4 to 5 miles per day at a fairly rapid rate.  He has no angina nor limitation from cardiovascular standpoint at that time.    Current Outpatient Medications on File Prior to Visit   Medication Sig Dispense Refill    aspirin 325 MG tablet Take 1 tablet by mouth Daily.      folic acid (FOLVITE) 400 MCG tablet Take 1 tablet by mouth Daily.      metoprolol tartrate (LOPRESSOR) 50 MG tablet Take 0.5 tablets by mouth 2 (Two) Times a Day.      nitroglycerin (NITROSTAT) 0.4 MG SL tablet Place 1 tablet under the tongue Every 5 (Five) Minutes As Needed.      ramipril (ALTACE) 10 MG capsule Take 1 capsule by mouth Daily.  5    simvastatin (ZOCOR) 20 MG tablet " TAKE 1 TABLET EVERY OTHER DAY 45 tablet 3    tamsulosin (FLOMAX) 0.4 MG capsule 24 hr capsule Take 1 capsule by mouth As Needed.      [DISCONTINUED] metoprolol tartrate (LOPRESSOR) 25 MG tablet Take 1 tablet by mouth 2 (Two) Times a Day. 180 tablet 3     No current facility-administered medications on file prior to visit.       Latex, Plavix [clopidogrel bisulfate], and Prednisone    Past Medical History:   Diagnosis Date    Bone spur     neck     Bulging lumbar disc     CAD (coronary artery disease)     Dyslipidemia     Hypertension     Palpitations        Social History     Socioeconomic History    Marital status:    Tobacco Use    Smoking status: Former    Smokeless tobacco: Never   Vaping Use    Vaping Use: Never used   Substance and Sexual Activity    Alcohol use: No    Drug use: No    Sexual activity: Defer       Family History   Problem Relation Age of Onset    Heart attack Other     Diabetes Other     Heart disease Other         CABG    Hypertension Other     Diabetes Mother     Hypertension Mother     Diabetes Father     Hypertension Father     Heart disease Father     Hypertension Sister        Review of Systems   Constitutional: Negative.  Negative for chills, diaphoresis, fatigue and fever.   HENT:  Positive for sinus pressure.    Eyes: Negative.  Negative for visual disturbance.   Respiratory: Negative.  Negative for apnea, cough, chest tightness, shortness of breath and wheezing.    Cardiovascular:  Positive for palpitations (rarely). Negative for chest pain and leg swelling.   Gastrointestinal: Negative.  Negative for abdominal pain and blood in stool.   Endocrine: Negative.    Genitourinary: Negative.  Negative for hematuria.   Musculoskeletal:  Positive for arthralgias, back pain, neck pain and neck stiffness. Negative for myalgias.   Skin:  Negative for rash and wound.   Allergic/Immunologic: Positive for environmental allergies (seasonal, latex). Negative for food allergies.   Neurological:  "Negative.  Negative for dizziness, syncope, weakness, light-headedness, numbness and headaches.   Hematological: Negative.  Does not bruise/bleed easily.   Psychiatric/Behavioral: Negative.  Negative for sleep disturbance.        Objective   Vitals:    12/14/23 0846   BP: 136/77   Pulse: 62   SpO2: 99%   Weight: 84.4 kg (186 lb)   Height: 179.1 cm (70.5\")      /77   Pulse 62   Ht 179.1 cm (70.5\")   Wt 84.4 kg (186 lb)   SpO2 99%   BMI 26.31 kg/m²    Lab Results (most recent)       None          Physical Exam  Vitals and nursing note reviewed.   Constitutional:       General: He is not in acute distress.     Appearance: He is well-developed.   HENT:      Head: Normocephalic and atraumatic.   Eyes:      Conjunctiva/sclera: Conjunctivae normal.      Pupils: Pupils are equal, round, and reactive to light.   Neck:      Vascular: No JVD.      Trachea: No tracheal deviation.   Cardiovascular:      Rate and Rhythm: Normal rate and regular rhythm.      Heart sounds: Normal heart sounds.   Pulmonary:      Effort: Pulmonary effort is normal.      Breath sounds: Normal breath sounds.   Abdominal:      General: Bowel sounds are normal. There is no distension.      Palpations: Abdomen is soft. There is no mass.      Tenderness: There is no abdominal tenderness. There is no guarding or rebound.   Musculoskeletal:         General: No tenderness or deformity. Normal range of motion.      Cervical back: Normal range of motion and neck supple.   Skin:     General: Skin is warm and dry.      Coloration: Skin is not pale.      Findings: No erythema or rash.   Neurological:      Mental Status: He is alert and oriented to person, place, and time.   Psychiatric:         Behavior: Behavior normal.         Thought Content: Thought content normal.         Judgment: Judgment normal.           Procedure     ECG 12 Lead    Date/Time: 12/14/2023 8:50 AM  Performed by: Raghav Pleitez PA    Authorized by: Raghav Pleitez PA  " Comparison: compared with previous ECG from 12/14/2022  Comparison to previous ECG: Sinus rhythm, rate 60, borderline first-degree AV block, normal axis, early precordial R wave progression, no acute changes noted.             Assessment & Plan      Diagnosis Plan   1. Coronary artery disease involving native coronary artery of native heart without angina pectoris        2. Primary hypertension        3. Dyslipidemia          1.  At this time, the patient appears to be doing well.  He denies angina.  He has no failure nor dysrhythmic symptoms.  He reports that he is doing very well at this time.    2.  Blood pressure and lipid parameters historically been well-maintained on current medical regimen.  We will continue the same without change.    3.  For any change in clinical course he will return immediately.  We will continue to see the patient on routine 6 to 12-month intervals, sooner for complications.           Advance Care Planning   ACP discussion was held with the patient during this visit. Patient has an advance directive (not in EMR), copy requested.         Electronically signed by:

## 2023-12-14 NOTE — ADDENDUM NOTE
Addended by: SOURAV RASMUSSEN on: 12/14/2023 12:56 PM     Modules accepted: Orders     I-STAT Chem-8+ Results:   Value Reference Range   Sodium 136 136-145 mmol/L   Potassium  3.6 3.5-5.1 mmol/L   Chloride 98  mmol/L   Ionized Calcium 1.15 1.06-1.42 mmol/L   CO2 (measured) 24 23-29 mmol/L   Glucose 133  mg/dL   BUN 22 6-30 mg/dL   Creatinine 1.3 0.5-1.4 mg/dL   Hematocrit 36 36-54%

## 2023-12-18 DIAGNOSIS — I25.10 CORONARY ARTERY DISEASE INVOLVING NATIVE CORONARY ARTERY OF NATIVE HEART WITHOUT ANGINA PECTORIS: ICD-10-CM

## 2023-12-18 DIAGNOSIS — E78.5 DYSLIPIDEMIA: ICD-10-CM

## 2023-12-18 NOTE — TELEPHONE ENCOUNTER
Name from pharmacy: SIMVASTATIN 20MG TABLET 20 Tablet         Will file in chart as: simvastatin (ZOCOR) 20 MG tablet    Sig: TAKE 1 TABLET EVERY OTHER DAY    Disp: 45 tablet    Refills: 3    Start: 12/18/2023    Class: Normal    Non-formulary For: Coronary artery disease involving native coronary artery of native heart without angina pectoris, Dyslipidemia    Last ordered: 9 months ago (3/6/2023) by EN Ivy    Last refill: 12/18/2023    Rx #: [id number]    [facility]MG-CoA Reductase Inhibitors (Statins) Protocol Tcyqde2712/18/2023 04:27 PM   Protocol Details Lipid panel in past 12 months    ALK Phos in past 12 months    ALT in past 12 months    AST in past 12 months    Recent or future visit with authorizing provider

## 2023-12-19 RX ORDER — SIMVASTATIN 20 MG
TABLET ORAL
Qty: 45 TABLET | Refills: 3 | Status: SHIPPED | OUTPATIENT
Start: 2023-12-19

## 2024-02-07 ENCOUNTER — TELEPHONE (OUTPATIENT)
Dept: CARDIOLOGY | Facility: CLINIC | Age: 74
End: 2024-02-07
Payer: MEDICARE

## 2024-02-07 NOTE — TELEPHONE ENCOUNTER
[facility] received a fax that requires your attention. The document has been indexed to the patient’s chart for your review.      Reason for sending: EXTERNAL MEDICAL RECORD NOTIFICATION    Documents Description: IMAN HAD LDL 96 ON LAST LIPID PROFILE.  HE IS ON ZOCOR 20 QOD.  WHEN HE TOOK DAILY CAUSES MUSCLE ACHES.  PLEASE ADVISE.    Name of Sender: DR SUE WEBSTER    Date Indexed: 2/7/24    Notes (if needed): SEE FAX IN CHART UNDER CARE COORDINATION

## 2024-02-09 NOTE — TELEPHONE ENCOUNTER
Called patient with recommendations. He will consider medication adjustment or lipid clinic referral. I gave him my extension to call after thinking it over.     Raghav Pleitez PA Cheek, [place], MA  Caller: Unspecified (2 days ago,  9:33 AM)  Would the patient consider alternate statin therapy, may be Crestor 10 1 p.o. nightly.  Consider co-Q10 supplement with the same.  For failure, consider injectable cholesterol medications.

## 2024-12-17 ENCOUNTER — OFFICE VISIT (OUTPATIENT)
Dept: CARDIOLOGY | Facility: CLINIC | Age: 74
End: 2024-12-17
Payer: MEDICARE

## 2024-12-17 VITALS
DIASTOLIC BLOOD PRESSURE: 76 MMHG | OXYGEN SATURATION: 98 % | SYSTOLIC BLOOD PRESSURE: 146 MMHG | BODY MASS INDEX: 26.31 KG/M2 | HEIGHT: 71 IN | HEART RATE: 59 BPM

## 2024-12-17 DIAGNOSIS — I25.10 CORONARY ARTERY DISEASE INVOLVING NATIVE CORONARY ARTERY OF NATIVE HEART WITHOUT ANGINA PECTORIS: Primary | ICD-10-CM

## 2024-12-17 DIAGNOSIS — I10 PRIMARY HYPERTENSION: ICD-10-CM

## 2024-12-17 DIAGNOSIS — E78.5 DYSLIPIDEMIA: ICD-10-CM

## 2024-12-17 PROCEDURE — 1159F MED LIST DOCD IN RCRD: CPT | Performed by: PHYSICIAN ASSISTANT

## 2024-12-17 PROCEDURE — 99213 OFFICE O/P EST LOW 20 MIN: CPT | Performed by: PHYSICIAN ASSISTANT

## 2024-12-17 PROCEDURE — 3077F SYST BP >= 140 MM HG: CPT | Performed by: PHYSICIAN ASSISTANT

## 2024-12-17 PROCEDURE — 3078F DIAST BP <80 MM HG: CPT | Performed by: PHYSICIAN ASSISTANT

## 2024-12-17 PROCEDURE — 1160F RVW MEDS BY RX/DR IN RCRD: CPT | Performed by: PHYSICIAN ASSISTANT

## 2024-12-17 PROCEDURE — 93000 ELECTROCARDIOGRAM COMPLETE: CPT | Performed by: PHYSICIAN ASSISTANT

## 2024-12-17 NOTE — PROGRESS NOTES
Problem list     Subjective   Raghav Warner is a 74 y.o. male     Chief Complaint   Patient presents with    Follow-up     Yearly follow up     Palpitations   Problem list:  1.  Coronary artery disease.  1.1.  Catheterization, 11/2000, with reported stenting x 2 the LAD.  2.  Preserved systolic function.  3.  Hypertension  4.  Dyslipidemia.    HPI  The patient presents in clinic today for routine evaluation and follow-up.  He is can to do well over the past year.  He reports no angina.  He has stable dyspnea.  He has no failure nor dysrhythmic symptoms.  He exerts at at least moderate levels without limitation whatsoever from cardiovascular standpoint.  He had recent laboratories with his primary care provider where total cholesterol was 148, triglycerides 74, HDL 52, and LDL 82.  We have discussed intensifying lipid-lowering management, but he has been unable to tolerate that in the past.  He wants no adjustments.  Blood pressures remain normotensive at home.  He has no further complaints otherwise and feels that he is doing well.    Current Outpatient Medications on File Prior to Visit   Medication Sig Dispense Refill    aspirin 325 MG tablet Take 1 tablet by mouth Daily.      folic acid (FOLVITE) 400 MCG tablet Take 1 tablet by mouth Daily.      metoprolol tartrate (LOPRESSOR) 50 MG tablet Take 0.5 tablets by mouth 2 (Two) Times a Day.      nitroglycerin (NITROSTAT) 0.4 MG SL tablet Place 1 tablet under the tongue Every 5 (Five) Minutes As Needed.      ramipril (ALTACE) 10 MG capsule Take 1 capsule by mouth Daily.  5    simvastatin (ZOCOR) 20 MG tablet TAKE 1 TABLET EVERY OTHER DAY 45 tablet 3    tamsulosin (FLOMAX) 0.4 MG capsule 24 hr capsule Take 1 capsule by mouth As Needed.      [DISCONTINUED] nitroglycerin (NITROSTAT) 0.4 MG SL tablet 1 under the tongue as needed for angina, may repeat q5mins for up three doses 25 tablet 2     No current facility-administered medications on file prior to visit.  "      Latex, Plavix [clopidogrel bisulfate], and Prednisone    Past Medical History:   Diagnosis Date    Bone spur     neck     Bulging lumbar disc     CAD (coronary artery disease)     Dyslipidemia     Hypertension     Palpitations        Social History     Socioeconomic History    Marital status:    Tobacco Use    Smoking status: Former    Smokeless tobacco: Never   Vaping Use    Vaping status: Never Used   Substance and Sexual Activity    Alcohol use: No    Drug use: No    Sexual activity: Defer       Family History   Problem Relation Age of Onset    Heart attack Other     Diabetes Other     Heart disease Other         CABG    Hypertension Other     Diabetes Mother     Hypertension Mother     Diabetes Father     Hypertension Father     Heart disease Father     Hypertension Sister        Review of Systems   Constitutional: Negative.  Negative for chills, diaphoresis, fatigue and fever.   HENT: Negative.     Eyes: Negative.  Negative for visual disturbance.   Respiratory: Negative.  Negative for apnea, cough, chest tightness, shortness of breath and wheezing.    Cardiovascular:  Positive for palpitations. Negative for chest pain and leg swelling.   Gastrointestinal: Negative.  Negative for abdominal pain and blood in stool.   Endocrine: Negative.    Genitourinary: Negative.  Negative for hematuria.   Musculoskeletal:  Positive for arthralgias, back pain, myalgias, neck pain and neck stiffness.   Skin: Negative.  Negative for rash and wound.   Allergic/Immunologic: Negative.  Negative for environmental allergies and food allergies.   Neurological: Negative.  Negative for dizziness, syncope, weakness, light-headedness, numbness and headaches.   Hematological:  Bruises/bleeds easily.   Psychiatric/Behavioral: Negative.  Negative for sleep disturbance.        Objective   Vitals:    12/17/24 0843   BP: 146/76   Pulse: 59   SpO2: 98%   Height: 179.1 cm (70.5\")      /76   Pulse 59   Ht 179.1 cm (70.5\")   " SpO2 98%   BMI 26.31 kg/m²    Lab Results (most recent)       None          Physical Exam  Vitals and nursing note reviewed.   Constitutional:       General: He is not in acute distress.     Appearance: He is well-developed.   HENT:      Head: Normocephalic and atraumatic.   Eyes:      Conjunctiva/sclera: Conjunctivae normal.      Pupils: Pupils are equal, round, and reactive to light.   Neck:      Vascular: No JVD.      Trachea: No tracheal deviation.   Cardiovascular:      Rate and Rhythm: Normal rate and regular rhythm.      Heart sounds: Normal heart sounds.   Pulmonary:      Effort: Pulmonary effort is normal.      Breath sounds: Normal breath sounds.   Abdominal:      General: Bowel sounds are normal. There is no distension.      Palpations: Abdomen is soft. There is no mass.      Tenderness: There is no abdominal tenderness. There is no guarding or rebound.   Musculoskeletal:         General: No tenderness or deformity. Normal range of motion.      Cervical back: Normal range of motion and neck supple.   Skin:     General: Skin is warm and dry.      Coloration: Skin is not pale.      Findings: No erythema or rash.   Neurological:      Mental Status: He is alert and oriented to person, place, and time.   Psychiatric:         Behavior: Behavior normal.         Thought Content: Thought content normal.         Judgment: Judgment normal.           Procedure     ECG 12 Lead    Date/Time: 12/17/2024 8:54 AM  Performed by: Raghav Pleitez PA    Authorized by: Raghav Pleitez PA  Comparison: compared with previous ECG from 12/14/2023  Comparison to previous ECG: Sinus rhythm, rate 61, borderline first-degree AV block, no changes noted.             Assessment & Plan      Diagnosis Plan   1. Coronary artery disease involving native coronary artery of native heart without angina pectoris        2. Primary hypertension        3. Dyslipidemia        1.  At this time, the patient appears to be doing well from general  cardiovascular standpoint.  The patient denies angina.  He has no further cardiovascular symptoms or issues.  We have again discussed consideration for updating noninvasive testing including stress and echo studies.  He feels so well that he wants no further evaluation.  He will call for change in clinical course.    2.  Lipid hypertension parameters aredoing well on current medical regimen.  No adjustments will be made.    3.  We will continue to see the patient on routine 6 to 12-month intervals, sooner for complications.  He will return for issues immediately.             Advance Care Planning   ACP discussion was held with the patient during this visit. Patient has an advance directive in EMR which is still valid.            Electronically signed by:

## 2025-05-07 ENCOUNTER — TELEPHONE (OUTPATIENT)
Dept: CARDIOLOGY | Facility: CLINIC | Age: 75
End: 2025-05-07
Payer: MEDICARE

## 2025-05-07 NOTE — LETTER
May 9, 2025       To Whom It May Concern:    We build our practice on integrity and patient care. The highest compliment we can receive is the referral of friends, family and patients to our office. We want to take this time to thank you for considering our group as part of your care team.    The medical records for Raghav Warner 1950 were reviewed for pre-operative clearance on 05/09/2025. It has been determined that this patient has:  ACCEPTABLE RISK.    Raghav Warner is currently on the following medications that will need to be held prior to surgery: PATIENT MAY HOLD ASPIRIN 2 TO 3 DAYS PRIOR TO PROCEDURE.    This pre-operative evaluation has been completed based on patient reported medical conditions at the date of this letter, this evaluation may have considered in part results of additional testing, completion of an EKG and patient reported symptoms at the time of their visit.    Raghav Warner's pre-operative clearance is good for thirty(30) days from the date of this letter with no reported changes in health, symptoms or diagnosis from the patient, their primary care provider or your office.    Your office has reported the patient will under go EXTRACTION OF TOOTH on TO BE DETERMINED with Dr. PROSPER CARRASQUILLO. If this appointment is changed or moved outside of thirty(30) days from 05/09/2025 this pre-operative clearance is void.    If you have any further questions please give our office a call.    Thank you for your trust,      EN Marx

## 2025-05-07 NOTE — TELEPHONE ENCOUNTER
Received cardiac clearance request from DR PROSPER CARRASQUILLO stating pt has EXTRACTION OF TOOTH and is requiring a cardiac clearance. Placed cardiac clearance request in [facility]' inbox to review and address with provider.